# Patient Record
Sex: FEMALE | Race: WHITE | NOT HISPANIC OR LATINO | Employment: FULL TIME | URBAN - METROPOLITAN AREA
[De-identification: names, ages, dates, MRNs, and addresses within clinical notes are randomized per-mention and may not be internally consistent; named-entity substitution may affect disease eponyms.]

---

## 2023-04-01 ENCOUNTER — OFFICE VISIT (OUTPATIENT)
Dept: URGENT CARE | Facility: CLINIC | Age: 23
End: 2023-04-01
Payer: COMMERCIAL

## 2023-04-01 VITALS
HEIGHT: 60 IN | HEART RATE: 78 BPM | SYSTOLIC BLOOD PRESSURE: 138 MMHG | RESPIRATION RATE: 18 BRPM | WEIGHT: 180 LBS | BODY MASS INDEX: 35.34 KG/M2 | OXYGEN SATURATION: 100 % | DIASTOLIC BLOOD PRESSURE: 89 MMHG | TEMPERATURE: 97 F

## 2023-04-01 DIAGNOSIS — H66.92 LEFT OTITIS MEDIA, UNSPECIFIED OTITIS MEDIA TYPE: Primary | ICD-10-CM

## 2023-04-01 PROCEDURE — 99203 OFFICE O/P NEW LOW 30 MIN: CPT | Mod: S$GLB,,, | Performed by: NURSE PRACTITIONER

## 2023-04-01 PROCEDURE — 99203 PR OFFICE/OUTPT VISIT, NEW, LEVL III, 30-44 MIN: ICD-10-PCS | Mod: S$GLB,,, | Performed by: NURSE PRACTITIONER

## 2023-04-01 RX ORDER — AMOXICILLIN 500 MG/1
500 TABLET, FILM COATED ORAL EVERY 12 HOURS
Qty: 14 TABLET | Refills: 0 | Status: SHIPPED | OUTPATIENT
Start: 2023-04-01 | End: 2023-04-08

## 2023-04-01 RX ORDER — MINERAL OIL
180 ENEMA (ML) RECTAL
COMMUNITY
End: 2024-01-26 | Stop reason: ALTCHOICE

## 2023-04-01 RX ORDER — CETIRIZINE HYDROCHLORIDE 10 MG/1
10 TABLET ORAL
COMMUNITY
End: 2024-01-26 | Stop reason: ALTCHOICE

## 2023-04-01 RX ORDER — METFORMIN HYDROCHLORIDE 500 MG/1
500 TABLET ORAL 2 TIMES DAILY
COMMUNITY
Start: 2023-03-08 | End: 2024-01-26 | Stop reason: ALTCHOICE

## 2023-04-01 RX ORDER — LIDOCAINE HYDROCHLORIDE 20 MG/ML
SOLUTION ORAL; TOPICAL
COMMUNITY
Start: 2022-10-24 | End: 2024-01-26 | Stop reason: ALTCHOICE

## 2023-04-01 RX ORDER — SODIUM FLUORIDE 5 MG/G
CREAM DENTAL
COMMUNITY
Start: 2023-01-25 | End: 2024-01-26 | Stop reason: ALTCHOICE

## 2023-04-01 NOTE — PATIENT INSTRUCTIONS

## 2023-04-01 NOTE — PROGRESS NOTES
Subjective:      Patient ID: Ayaka Browne is a 22 y.o. female.    Vitals:  height is 5' (1.524 m) and weight is 81.6 kg (180 lb). Her temperature is 97.3 °F (36.3 °C). Her blood pressure is 138/89 and her pulse is 78. Her respiration is 18 and oxygen saturation is 100%.     Chief Complaint: Otalgia    Patient reports bilateral sided ear pain, with the left being worse since yesterday. She reports  2/10 pain. Patient has tried OTC ear drops with no relief.     Provider note begins below:  Patient reports that she cleaned both of her ears out with Debrox.  Patient reports that she is still having pain in her left ear today.  Patient denies any fever, chills, throat pain or cough.  Patient is awake and alert.  No acute distress.  Afebrile.    Otalgia   There is pain in both ears. This is a new problem. The current episode started yesterday. The problem occurs constantly. The problem has been unchanged. There has been no fever. The fever has been present for Less than 1 day. The pain is at a severity of 2/10. The pain is mild. Associated symptoms include ear discharge and hearing loss. Pertinent negatives include no coughing, diarrhea, rash, sore throat or vomiting. She has tried ear drops for the symptoms. The treatment provided no relief.     Constitution: Negative. Negative for chills, sweating and fatigue.   HENT:  Positive for ear pain, ear discharge and hearing loss. Negative for facial swelling, congestion and sore throat.    Neck: Negative for painful lymph nodes.   Cardiovascular: Negative.  Negative for chest trauma, chest pain and sob on exertion.   Eyes: Negative.  Negative for eye itching and eye pain.   Respiratory: Negative.  Negative for chest tightness, cough and asthma.    Gastrointestinal: Negative.  Negative for nausea, vomiting and diarrhea.   Endocrine: negative. cold intolerance and excessive thirst.   Genitourinary: Negative.  Negative for dysuria, frequency, urgency and hematuria.    Musculoskeletal:  Negative for pain, trauma and joint pain.   Skin: Negative.  Negative for rash, wound and hives.   Allergic/Immunologic: Negative.  Negative for eczema, asthma, hives and itching.   Neurological: Negative.  Negative for disorientation and altered mental status.   Hematologic/Lymphatic: Negative.  Negative for swollen lymph nodes.   Psychiatric/Behavioral: Negative.  Negative for altered mental status, disorientation and confusion.     Objective:     Physical Exam   Constitutional: She is oriented to person, place, and time. She appears well-developed. She is cooperative.  Non-toxic appearance. She does not appear ill. No distress.   HENT:   Head: Normocephalic and atraumatic.   Ears:   Right Ear: Hearing, external ear and ear canal normal. No middle ear effusion. impacted cerumen  Left Ear: Hearing, external ear and ear canal normal. There is tenderness. Tympanic membrane is injected and erythematous. A middle ear effusion is present. impacted cerumen  Nose: Nose normal. No mucosal edema, rhinorrhea or nasal deformity. No epistaxis. Right sinus exhibits no maxillary sinus tenderness and no frontal sinus tenderness. Left sinus exhibits no maxillary sinus tenderness and no frontal sinus tenderness.   Mouth/Throat: Uvula is midline, oropharynx is clear and moist and mucous membranes are normal. No trismus in the jaw. Normal dentition. No uvula swelling. No oropharyngeal exudate, posterior oropharyngeal edema or posterior oropharyngeal erythema.   Negative for tragus pain.      Comments: Negative for tragus pain.  Eyes: Conjunctivae and lids are normal. No scleral icterus.   Neck: Trachea normal and phonation normal. Neck supple. No edema present. No erythema present. No neck rigidity present.   Cardiovascular: Normal rate, regular rhythm, normal heart sounds and normal pulses.   Pulmonary/Chest: Effort normal and breath sounds normal. No stridor. No respiratory distress. She has no decreased breath  sounds. She has no wheezes. She has no rhonchi. She has no rales. She exhibits no tenderness.   Abdominal: Normal appearance.   Musculoskeletal: Normal range of motion.         General: No deformity. Normal range of motion.   Neurological: no focal deficit. She is alert and oriented to person, place, and time. She exhibits normal muscle tone. Coordination normal.   Skin: Skin is warm, dry, intact, not diaphoretic and not pale.   Psychiatric: Her speech is normal and behavior is normal. Mood, judgment and thought content normal.   Nursing note and vitals reviewed.    Assessment:     1. Left otitis media, unspecified otitis media type        Plan:   FOLLOWUP  Follow up if symptoms worsen or fail to improve, for PLEASE CONTACT PCP OR CONTACT THE EMERGENCY ROOM..     PATIENT INSTRUCTIONS  Patient Instructions   INSTRUCTIONS:  - Rest.  - Drink plenty of fluids.  - Take Tylenol and/or Ibuprofen as directed as needed for fever/pain.  Do not take more than the recommended dose.  - follow up with your PCP within the next 1-2 weeks as needed.  - You must understand that you have received an Urgent Care treatment only and that you may be released before all of your medical problems are known or treated.   - You, the patient, will arrange for follow up care as instructed.   - If your condition worsens or fails to improve we recommend that you receive another evaluation at the ER immediately or contact your PCP to discuss your concerns.   - You can call [phone] or [phone] to help schedule an appointment with the appropriate provider.     -If you smoke cigarettes, it would be beneficial for you to stop.         THANK YOU FOR ALLOWING ME TO PARTICIPATE IN YOUR HEALTHCARE,     Gianluca Ledezma NP     Left otitis media, unspecified otitis media type  -     amoxicillin (AMOXIL) 500 MG Tab; Take 1 tablet (500 mg total) by mouth every 12 (twelve) hours. for 7 days  Dispense: 14 tablet; Refill: 0

## 2023-09-12 ENCOUNTER — TELEPHONE (OUTPATIENT)
Dept: OBSTETRICS AND GYNECOLOGY | Facility: CLINIC | Age: 23
End: 2023-09-12
Payer: COMMERCIAL

## 2023-09-12 NOTE — TELEPHONE ENCOUNTER
Patient wanted a sooner appointment due to nipple discharge and lump in breast. Appointment scheduled with La[facility]. Patient verbalized understanding. Wanted to keep appointment with Eric[facility]on.    ----- Message from [place] [facility] sent at 9/12/2023  8:26 AM CDT -----  Regarding: sooner appt  Contact: oly at [phone]  Type:  Sooner Appointment Request    Caller is requesting a sooner appointment.  Caller declined first available appointment listed below.  Caller will not accept being placed on the waitlist and is requesting a message be sent to doctor.    Name of Caller:  Oly    When is the first available appointment?  11/29 (pt's current appt)    Symptoms:  Lump in Rt Breast    Best Call Back Number:  [phone]    Additional Information:  pt is ok to get referral to [facility] if get seen sooner.

## 2023-09-20 ENCOUNTER — OFFICE VISIT (OUTPATIENT)
Dept: OBSTETRICS AND GYNECOLOGY | Facility: CLINIC | Age: 23
End: 2023-09-20
Payer: COMMERCIAL

## 2023-09-20 VITALS
BODY MASS INDEX: 33.46 KG/M2 | WEIGHT: 170.44 LBS | HEIGHT: 60 IN | SYSTOLIC BLOOD PRESSURE: 114 MMHG | DIASTOLIC BLOOD PRESSURE: 70 MMHG

## 2023-09-20 DIAGNOSIS — N60.01 CYST OF RIGHT BREAST: Primary | ICD-10-CM

## 2023-09-20 PROCEDURE — 99999 PR PBB SHADOW E&M-EST. PATIENT-LVL III: ICD-10-PCS | Mod: PBBFAC,,, | Performed by: STUDENT IN AN ORGANIZED HEALTH CARE EDUCATION/TRAINING PROGRAM

## 2023-09-20 PROCEDURE — 1159F MED LIST DOCD IN RCRD: CPT | Mod: CPTII,S$GLB,, | Performed by: STUDENT IN AN ORGANIZED HEALTH CARE EDUCATION/TRAINING PROGRAM

## 2023-09-20 PROCEDURE — 3074F SYST BP LT 130 MM HG: CPT | Mod: CPTII,S$GLB,, | Performed by: STUDENT IN AN ORGANIZED HEALTH CARE EDUCATION/TRAINING PROGRAM

## 2023-09-20 PROCEDURE — 3008F BODY MASS INDEX DOCD: CPT | Mod: CPTII,S$GLB,, | Performed by: STUDENT IN AN ORGANIZED HEALTH CARE EDUCATION/TRAINING PROGRAM

## 2023-09-20 PROCEDURE — 3078F PR MOST RECENT DIASTOLIC BLOOD PRESSURE < 80 MM HG: ICD-10-PCS | Mod: CPTII,S$GLB,, | Performed by: STUDENT IN AN ORGANIZED HEALTH CARE EDUCATION/TRAINING PROGRAM

## 2023-09-20 PROCEDURE — 3078F DIAST BP <80 MM HG: CPT | Mod: CPTII,S$GLB,, | Performed by: STUDENT IN AN ORGANIZED HEALTH CARE EDUCATION/TRAINING PROGRAM

## 2023-09-20 PROCEDURE — 99204 OFFICE O/P NEW MOD 45 MIN: CPT | Mod: S$GLB,,, | Performed by: STUDENT IN AN ORGANIZED HEALTH CARE EDUCATION/TRAINING PROGRAM

## 2023-09-20 PROCEDURE — 99999 PR PBB SHADOW E&M-EST. PATIENT-LVL III: CPT | Mod: PBBFAC,,, | Performed by: STUDENT IN AN ORGANIZED HEALTH CARE EDUCATION/TRAINING PROGRAM

## 2023-09-20 PROCEDURE — 99204 PR OFFICE/OUTPT VISIT, NEW, LEVL IV, 45-59 MIN: ICD-10-PCS | Mod: S$GLB,,, | Performed by: STUDENT IN AN ORGANIZED HEALTH CARE EDUCATION/TRAINING PROGRAM

## 2023-09-20 PROCEDURE — 1159F PR MEDICATION LIST DOCUMENTED IN MEDICAL RECORD: ICD-10-PCS | Mod: CPTII,S$GLB,, | Performed by: STUDENT IN AN ORGANIZED HEALTH CARE EDUCATION/TRAINING PROGRAM

## 2023-09-20 PROCEDURE — 3074F PR MOST RECENT SYSTOLIC BLOOD PRESSURE < 130 MM HG: ICD-10-PCS | Mod: CPTII,S$GLB,, | Performed by: STUDENT IN AN ORGANIZED HEALTH CARE EDUCATION/TRAINING PROGRAM

## 2023-09-20 PROCEDURE — 3008F PR BODY MASS INDEX (BMI) DOCUMENTED: ICD-10-PCS | Mod: CPTII,S$GLB,, | Performed by: STUDENT IN AN ORGANIZED HEALTH CARE EDUCATION/TRAINING PROGRAM

## 2023-09-20 NOTE — PROGRESS NOTES
History & Physical  Gynecology      SUBJECTIVE:     Chief Complaint: Breast mass/Nipple Discharge and Establish Care       History of Present Illness:    Here today for right breast mass. Has been present for at least a year. Occasional clear, spontaneous nipple discharge. No bloody nipple dc. No breast trauma. No immediate FH of breast cancer. No new meds. IUD, no cycles. Sometimes feels like it changes depending on timing of the month.       Review of patient's allergies indicates:   Allergen Reactions    Azithromycin Rash    Minocycline Swelling     Other reaction(s): lips, eyes and hands swellig  Swelling of face and lips  Swelling of face and lips      Codeine     Rice Other (See Comments)     Congestion  Congestion         History reviewed. No pertinent past medical history.  Past Surgical History:   Procedure Laterality Date    ANTERIOR CRUCIATE LIGAMENT REPAIR      TONSILLECTOMY AND ADENOIDECTOMY      WISDOM TOOTH EXTRACTION      WRIST SURGERY       OB History          0    Para   0    Term   0       0    AB   0    Living   0         SAB   0    IAB   0    Ectopic   0    Multiple   0    Live Births   0               Family History   Problem Relation Age of Onset    Ovarian cancer Paternal Grandmother     Colon cancer Maternal Grandmother     Breast cancer Neg Hx      Social History     Tobacco Use    Smoking status: Never    Smokeless tobacco: Never   Substance Use Topics    Alcohol use: Yes     Comment: Occ/socially    Drug use: Never       Current Outpatient Medications   Medication Sig    cetirizine (ZYRTEC) 10 MG tablet Take 10 mg by mouth.    fexofenadine (ALLEGRA) 180 MG tablet Take 180 mg by mouth.    LIDOCAINE VISCOUS 2 % solution SMARTSIG:15 Milliliter(s) By Mouth Every 3 Hours PRN    metFORMIN (GLUCOPHAGE) 500 MG tablet Take 500 mg by mouth 2 (two) times daily.    SODIUM FLUORIDE 5000 PLUS 1.1 % Crea SMARTSIG:sparingly Topical Every Night     No current facility-administered  medications for this visit.         Review of Systems:  Review of Systems   Constitutional:  Negative for chills, fatigue and fever.   HENT:  Negative for congestion.    Eyes:  Negative for visual disturbance.   Respiratory:  Negative for cough and shortness of breath.    Cardiovascular:  Negative for chest pain and palpitations.   Gastrointestinal:  Negative for abdominal distention, abdominal pain, constipation, diarrhea, nausea and vomiting.   Genitourinary:  Negative for difficulty urinating, dysuria, hematuria, vaginal bleeding and vaginal discharge.   Skin:  Negative for rash.   Neurological:  Negative for dizziness, seizures, light-headedness and headaches.   Hematological:  Does not bruise/bleed easily.   Psychiatric/Behavioral:  Negative for dysphoric mood. The patient is not nervous/anxious.         OBJECTIVE:     Physical Exam:  Physical Exam  Vitals reviewed.   Constitutional:       General: She is not in acute distress.     Appearance: Normal appearance. She is well-developed.   HENT:      Head: Normocephalic and atraumatic.   Cardiovascular:      Rate and Rhythm: Normal rate and regular rhythm.   Pulmonary:      Effort: Pulmonary effort is normal.   Chest:   Breasts:     Right: Mass present. No swelling, bleeding, inverted nipple, nipple discharge, skin change or tenderness.      Left: Swelling present. No bleeding, inverted nipple, mass, nipple discharge, skin change or tenderness.          Comments: 1cm breast cyst noted 9-10 oclock 2cm from areola. Some induration and swelling present around it.   Abdominal:      General: There is no distension.      Palpations: Abdomen is soft.   Genitourinary:     Vagina: Normal.   Skin:     General: Skin is warm.   Neurological:      Mental Status: She is alert and oriented to person, place, and time.   Psychiatric:         Behavior: Behavior normal.         Thought Content: Thought content normal.         Judgment: Judgment normal.           ASSESSMENT:        ICD-10-CM ICD-9-CM    1. Cyst of right breast  N60.01 610.0 US Breast Right Complete      PROLACTIN          Orders Placed This Encounter   Procedures    US Breast Right Complete     Standing Status:   Future     Standing Expiration Date:   9/20/2025     Order Specific Question:   May the Radiologist modify the order per protocol to meet the clinical needs of the patient?     Answer:   Yes     Order Specific Question:   Release to patient     Answer:   Immediate    PROLACTIN     Standing Status:   Future     Standing Expiration Date:   11/18/2024           Plan:      - reassurance given  - discussed likely fibroadenoma vs fibrocystic breast dx.   - US and prolactin ordered. Discussed likely will just do surveillance based off of symptoms and annual breast exam  - all questions answered    Cait Almendarez M.D.  Obstetrics and Gynecology

## 2023-09-21 ENCOUNTER — HOSPITAL ENCOUNTER (OUTPATIENT)
Dept: RADIOLOGY | Facility: HOSPITAL | Age: 23
Discharge: HOME | End: 2023-09-21
Attending: STUDENT IN AN ORGANIZED HEALTH CARE EDUCATION/TRAINING PROGRAM
Payer: COMMERCIAL

## 2023-09-21 DIAGNOSIS — N60.01 CYST OF RIGHT BREAST: ICD-10-CM

## 2023-09-21 PROCEDURE — 76642 US BREAST RIGHT LIMITED: ICD-10-PCS | Mod: 26,RT,, | Performed by: RADIOLOGY

## 2023-09-21 PROCEDURE — 76642 ULTRASOUND BREAST LIMITED: CPT | Mod: TC,PO,RT

## 2023-09-21 PROCEDURE — 76642 ULTRASOUND BREAST LIMITED: CPT | Mod: 26,RT,, | Performed by: RADIOLOGY

## 2023-10-12 ENCOUNTER — TELEPHONE (OUTPATIENT)
Dept: OBSTETRICS AND GYNECOLOGY | Facility: CLINIC | Age: 23
End: 2023-10-12
Payer: COMMERCIAL

## 2023-10-12 NOTE — TELEPHONE ENCOUNTER
----- Message from Miladys Suarez sent at 10/12/2023 12:42 PM CDT -----  Contact: self  Type:  Needs Medical Advice    Who Called: self  Symptoms (please be specific):pt wants to make sure she can see dr before her maternity leave, Pt would like to see if she can be seen earlier or can a different dr do her yearly exam.   Would the patient rather a call back or a response via [facility]? call  Best Call Back Number: [phone] (home)     Additional Information: please call and advise. Thank you.

## 2024-03-12 RX ORDER — FLUCONAZOLE 150 MG/1
150 TABLET ORAL ONCE
Qty: 1 TABLET | Refills: 1 | Status: SHIPPED | OUTPATIENT
Start: 2024-03-12 | End: 2024-03-12

## 2024-03-13 RX ORDER — FLUCONAZOLE 150 MG/1
150 TABLET ORAL ONCE
Qty: 1 TABLET | Refills: 1 | OUTPATIENT
Start: 2024-03-13 | End: 2024-03-13

## 2024-03-20 ENCOUNTER — OFFICE VISIT (OUTPATIENT)
Dept: OBSTETRICS AND GYNECOLOGY | Facility: CLINIC | Age: 24
End: 2024-03-20
Payer: COMMERCIAL

## 2024-03-20 VITALS
HEIGHT: 60 IN | BODY MASS INDEX: 34.49 KG/M2 | DIASTOLIC BLOOD PRESSURE: 72 MMHG | SYSTOLIC BLOOD PRESSURE: 112 MMHG | WEIGHT: 175.69 LBS

## 2024-03-20 DIAGNOSIS — E28.2 PCOS (POLYCYSTIC OVARIAN SYNDROME): ICD-10-CM

## 2024-03-20 DIAGNOSIS — N89.8 VAGINAL IRRITATION: ICD-10-CM

## 2024-03-20 DIAGNOSIS — Z01.419 WELL WOMAN EXAM: Primary | ICD-10-CM

## 2024-03-20 PROCEDURE — 1159F MED LIST DOCD IN RCRD: CPT | Mod: CPTII,S$GLB,, | Performed by: STUDENT IN AN ORGANIZED HEALTH CARE EDUCATION/TRAINING PROGRAM

## 2024-03-20 PROCEDURE — 3078F DIAST BP <80 MM HG: CPT | Mod: CPTII,S$GLB,, | Performed by: STUDENT IN AN ORGANIZED HEALTH CARE EDUCATION/TRAINING PROGRAM

## 2024-03-20 PROCEDURE — 99214 OFFICE O/P EST MOD 30 MIN: CPT | Mod: 25,S$GLB,, | Performed by: STUDENT IN AN ORGANIZED HEALTH CARE EDUCATION/TRAINING PROGRAM

## 2024-03-20 PROCEDURE — 3008F BODY MASS INDEX DOCD: CPT | Mod: CPTII,S$GLB,, | Performed by: STUDENT IN AN ORGANIZED HEALTH CARE EDUCATION/TRAINING PROGRAM

## 2024-03-20 PROCEDURE — 87624 HPV HI-RISK TYP POOLED RSLT: CPT | Performed by: STUDENT IN AN ORGANIZED HEALTH CARE EDUCATION/TRAINING PROGRAM

## 2024-03-20 PROCEDURE — 87491 CHLMYD TRACH DNA AMP PROBE: CPT | Performed by: STUDENT IN AN ORGANIZED HEALTH CARE EDUCATION/TRAINING PROGRAM

## 2024-03-20 PROCEDURE — 3074F SYST BP LT 130 MM HG: CPT | Mod: CPTII,S$GLB,, | Performed by: STUDENT IN AN ORGANIZED HEALTH CARE EDUCATION/TRAINING PROGRAM

## 2024-03-20 PROCEDURE — 99395 PREV VISIT EST AGE 18-39: CPT | Mod: 25,S$GLB,, | Performed by: STUDENT IN AN ORGANIZED HEALTH CARE EDUCATION/TRAINING PROGRAM

## 2024-03-20 PROCEDURE — 99999 PR PBB SHADOW E&M-EST. PATIENT-LVL III: CPT | Mod: PBBFAC,,, | Performed by: STUDENT IN AN ORGANIZED HEALTH CARE EDUCATION/TRAINING PROGRAM

## 2024-03-20 PROCEDURE — 81514 NFCT DS BV&VAGINITIS DNA ALG: CPT | Performed by: STUDENT IN AN ORGANIZED HEALTH CARE EDUCATION/TRAINING PROGRAM

## 2024-03-20 PROCEDURE — 88175 CYTOPATH C/V AUTO FLUID REDO: CPT | Performed by: STUDENT IN AN ORGANIZED HEALTH CARE EDUCATION/TRAINING PROGRAM

## 2024-03-20 RX ORDER — METFORMIN HYDROCHLORIDE 500 MG/1
500 TABLET, EXTENDED RELEASE ORAL
Qty: 90 TABLET | Refills: 3 | Status: SHIPPED | OUTPATIENT
Start: 2024-03-20 | End: 2025-03-20

## 2024-03-20 NOTE — PROGRESS NOTES
History & Physical  Gynecology      SUBJECTIVE:     Chief Complaint: Well Woman       History of Present Illness:    Here for annual. Hx of PCOS. Having some vaginal irritation     Gyn: irregular periods/amenorrhea, has IUD in place. No hx of abnormal pap. Just recently became sexually active. No hx of STI. No immediate FH of gyn or colon cancer    No tobacco     L&D RN @ [facility]    Review of patient's allergies indicates:   Allergen Reactions    Azithromycin Rash    Minocycline Swelling     Other reaction(s): lips, eyes and hands swellig  Swelling of face and lips  Swelling of face and lips      Codeine     Rice Other (See Comments)     Congestion  Congestion         No past medical history on file.  Past Surgical History:   Procedure Laterality Date    ANTERIOR CRUCIATE LIGAMENT REPAIR      TONSILLECTOMY AND ADENOIDECTOMY      WISDOM TOOTH EXTRACTION      WRIST SURGERY       OB History          0    Para   0    Term   0       0    AB   0    Living   0         SAB   0    IAB   0    Ectopic   0    Multiple   0    Live Births   0               Family History   Problem Relation Age of Onset    Colon cancer Maternal Grandmother     Ovarian cancer Paternal Grandmother     Breast cancer Neg Hx      Social History     Tobacco Use    Smoking status: Never    Smokeless tobacco: Never   Substance Use Topics    Alcohol use: Yes     Comment: Occ/socially    Drug use: Never       Current Outpatient Medications   Medication Sig    busPIRone (BUSPAR) 10 MG tablet Take 1 tablet (10 mg total) by mouth 3 (three) times daily as needed (anxiety).    FLUoxetine 10 MG capsule Take 1 capsule (10 mg total) by mouth once daily.    metFORMIN (GLUCOPHAGE-XR) 500 MG ER 24hr tablet Take 1 tablet (500 mg total) by mouth daily with breakfast.    propranoloL (INDERAL) 10 MG tablet Take 1 tablet (10 mg total) by mouth daily as needed (anxiety).    tiZANidine (ZANAFLEX) 2 MG tablet Take 2 tablets (4 mg total) by mouth nightly as needed  (headache).    WEGOVY 1.7 mg/0.75 mL PnIj      No current facility-administered medications for this visit.         Review of Systems:  Review of Systems   Constitutional:  Negative for chills, fatigue and fever.   HENT:  Negative for congestion.    Eyes:  Negative for visual disturbance.   Respiratory:  Negative for cough and shortness of breath.    Cardiovascular:  Negative for chest pain and palpitations.   Gastrointestinal:  Negative for abdominal distention, abdominal pain, constipation, diarrhea, nausea and vomiting.   Genitourinary:  Negative for difficulty urinating, dysuria, hematuria, vaginal bleeding and vaginal discharge.   Skin:  Negative for rash.   Neurological:  Negative for dizziness, seizures, light-headedness and headaches.   Hematological:  Does not bruise/bleed easily.   Psychiatric/Behavioral:  Negative for dysphoric mood. The patient is not nervous/anxious.         OBJECTIVE:     Physical Exam:  Physical Exam  Vitals reviewed.   Constitutional:       General: She is not in acute distress.     Appearance: Normal appearance. She is well-developed.   HENT:      Head: Normocephalic and atraumatic.   Cardiovascular:      Rate and Rhythm: Normal rate and regular rhythm.   Pulmonary:      Effort: Pulmonary effort is normal.   Chest:   Breasts:     Right: No inverted nipple, mass, nipple discharge, skin change or tenderness.      Left: No inverted nipple, mass, nipple discharge, skin change or tenderness.   Abdominal:      General: There is no distension.      Palpations: Abdomen is soft.      Tenderness: There is no abdominal tenderness.   Genitourinary:     Vagina: Normal.      Comments: Normal external female genitalia, normal hair distribution. Vaginal mucosa pink, moist, well rugated, scant white physiologic discharge. No blood in vault. Cervix pink, non-friable, without lesion. No CMT. Uterus non tender, mobile, not enlarged. Adnexa without fullness or tenderness.    IUD strings  present    Chaperone present   Skin:     General: Skin is warm.   Neurological:      Mental Status: She is alert and oriented to person, place, and time.   Psychiatric:         Behavior: Behavior normal.         Thought Content: Thought content normal.         Judgment: Judgment normal.           ASSESSMENT:       ICD-10-CM ICD-9-CM    1. Well woman exam  Z01.419 V72.31 Liquid-Based Pap Smear, Screening      C. trachomatis/N. gonorrhoeae by AMP DNA      2. PCOS (polycystic ovarian syndrome)  E28.2 256.4 Hemoglobin A1C      metFORMIN (GLUCOPHAGE-XR) 500 MG ER 24hr tablet      3. Vaginal irritation  N89.8 623.9 Vaginosis Screen by DNA Probe          Orders Placed This Encounter   Procedures    C. trachomatis/N. gonorrhoeae by AMP DNA     Order Specific Question:   Source:     Answer:   Cervicovaginal    Vaginosis Screen by DNA Probe     Release to patient->Immediate     Order Specific Question:   Release to patient     Answer:   Immediate    Hemoglobin A1C     Standing Status:   Future     Standing Expiration Date:   6/18/2025           Plan:      Well woman  - - Pap today   - tobacco cessation n/a  - contraception IUD  - HPV vaccine hasn't had, consider   - Safe Sex n/a  - Counseled to take daily multivitamin. If patient is of reproductive age and not on contraception, to take prenatal vitamin. Patient has been counseled on the vitamin D and calcium requirements per ACOG recommendations.  Age   Calcium(mg/day)   Vitamin D (IU/day)  9-18    1300                       600  19-50  1000                       600  51-70  1200                       600  >70      1,200                      800  Patient to start PNV, vit D  - Covid vaccine n/a  - Discussed IPV, feels safe     PCOS:  - hemoglobin A1c ordered  - start myoinositiol supplements  - metformin, discussed limitiation    Vaginal irritation  - likely friction from recent new sexual activity  - affirm collected    Cait Almendarez M.D.  Obstetrics and Gynecology

## 2024-03-21 ENCOUNTER — LAB VISIT (OUTPATIENT)
Dept: LAB | Facility: HOSPITAL | Age: 24
End: 2024-03-21
Attending: STUDENT IN AN ORGANIZED HEALTH CARE EDUCATION/TRAINING PROGRAM
Payer: COMMERCIAL

## 2024-03-21 DIAGNOSIS — E28.2 PCOS (POLYCYSTIC OVARIAN SYNDROME): ICD-10-CM

## 2024-03-21 LAB
BACTERIAL VAGINOSIS DNA: NEGATIVE
C TRACH DNA SPEC QL NAA+PROBE: NOT DETECTED
CANDIDA GLABRATA DNA: NEGATIVE
CANDIDA KRUSEI DNA: NEGATIVE
CANDIDA RRNA VAG QL PROBE: NEGATIVE
ESTIMATED AVG GLUCOSE: 85 MG/DL (ref 68–131)
HBA1C MFR BLD: 4.6 % (ref 4–5.6)
N GONORRHOEA DNA SPEC QL NAA+PROBE: NOT DETECTED
T VAGINALIS RRNA GENITAL QL PROBE: NEGATIVE

## 2024-03-21 PROCEDURE — 36415 COLL VENOUS BLD VENIPUNCTURE: CPT | Mod: PN | Performed by: STUDENT IN AN ORGANIZED HEALTH CARE EDUCATION/TRAINING PROGRAM

## 2024-03-21 PROCEDURE — 83036 HEMOGLOBIN GLYCOSYLATED A1C: CPT | Performed by: STUDENT IN AN ORGANIZED HEALTH CARE EDUCATION/TRAINING PROGRAM

## 2024-03-30 LAB
CLINICAL INFO: ABNORMAL
CYTO CVX: ABNORMAL
CYTOLOGIST CVX/VAG CYTO: ABNORMAL
CYTOLOGIST CVX/VAG CYTO: ABNORMAL
CYTOLOGY CMNT CVX/VAG CYTO-IMP: ABNORMAL
CYTOLOGY PAP THIN PREP EXPLANATION: ABNORMAL
DATE OF PREVIOUS PAP: ABNORMAL
DATE PREVIOUS BX: NO
GEN CATEG CVX/VAG CYTO-IMP: ABNORMAL
HPV I/H RISK 4 DNA CVX QL NAA+PROBE: NOT DETECTED
LMP START DATE: ABNORMAL
MICROORGANISM CVX/VAG CYTO: ABNORMAL
PATHOLOGIST CVX/VAG CYTO: ABNORMAL
SERVICE CMNT-IMP: ABNORMAL
SPECIMEN SOURCE CVX/VAG CYTO: ABNORMAL
STAT OF ADQ CVX/VAG CYTO-IMP: ABNORMAL

## 2024-05-02 ENCOUNTER — PATIENT MESSAGE (OUTPATIENT)
Dept: OBSTETRICS AND GYNECOLOGY | Facility: CLINIC | Age: 24
End: 2024-05-02
Payer: COMMERCIAL

## 2024-10-01 ENCOUNTER — PATIENT MESSAGE (OUTPATIENT)
Dept: OBSTETRICS AND GYNECOLOGY | Facility: CLINIC | Age: 24
End: 2024-10-01

## 2024-10-01 ENCOUNTER — OFFICE VISIT (OUTPATIENT)
Dept: OBSTETRICS AND GYNECOLOGY | Facility: CLINIC | Age: 24
End: 2024-10-01
Payer: COMMERCIAL

## 2024-10-01 VITALS
DIASTOLIC BLOOD PRESSURE: 88 MMHG | HEIGHT: 60 IN | WEIGHT: 196.44 LBS | BODY MASS INDEX: 38.56 KG/M2 | SYSTOLIC BLOOD PRESSURE: 122 MMHG

## 2024-10-01 DIAGNOSIS — N76.0 ACUTE VAGINITIS: Primary | ICD-10-CM

## 2024-10-01 DIAGNOSIS — R68.82 DECREASED LIBIDO: ICD-10-CM

## 2024-10-01 DIAGNOSIS — N95.2 VAGINAL ATROPHY: ICD-10-CM

## 2024-10-01 PROCEDURE — 1159F MED LIST DOCD IN RCRD: CPT | Mod: CPTII,S$GLB,, | Performed by: STUDENT IN AN ORGANIZED HEALTH CARE EDUCATION/TRAINING PROGRAM

## 2024-10-01 PROCEDURE — 87491 CHLMYD TRACH DNA AMP PROBE: CPT | Performed by: STUDENT IN AN ORGANIZED HEALTH CARE EDUCATION/TRAINING PROGRAM

## 2024-10-01 PROCEDURE — 3008F BODY MASS INDEX DOCD: CPT | Mod: CPTII,S$GLB,, | Performed by: STUDENT IN AN ORGANIZED HEALTH CARE EDUCATION/TRAINING PROGRAM

## 2024-10-01 PROCEDURE — 3079F DIAST BP 80-89 MM HG: CPT | Mod: CPTII,S$GLB,, | Performed by: STUDENT IN AN ORGANIZED HEALTH CARE EDUCATION/TRAINING PROGRAM

## 2024-10-01 PROCEDURE — 87591 N.GONORRHOEAE DNA AMP PROB: CPT | Performed by: STUDENT IN AN ORGANIZED HEALTH CARE EDUCATION/TRAINING PROGRAM

## 2024-10-01 PROCEDURE — 0352U VAGINOSIS SCREEN BY DNA PROBE: CPT | Performed by: STUDENT IN AN ORGANIZED HEALTH CARE EDUCATION/TRAINING PROGRAM

## 2024-10-01 PROCEDURE — 99999 PR PBB SHADOW E&M-EST. PATIENT-LVL III: CPT | Mod: PBBFAC,,, | Performed by: STUDENT IN AN ORGANIZED HEALTH CARE EDUCATION/TRAINING PROGRAM

## 2024-10-01 PROCEDURE — 99214 OFFICE O/P EST MOD 30 MIN: CPT | Mod: S$GLB,,, | Performed by: STUDENT IN AN ORGANIZED HEALTH CARE EDUCATION/TRAINING PROGRAM

## 2024-10-01 PROCEDURE — 3044F HG A1C LEVEL LT 7.0%: CPT | Mod: CPTII,S$GLB,, | Performed by: STUDENT IN AN ORGANIZED HEALTH CARE EDUCATION/TRAINING PROGRAM

## 2024-10-01 PROCEDURE — 3074F SYST BP LT 130 MM HG: CPT | Mod: CPTII,S$GLB,, | Performed by: STUDENT IN AN ORGANIZED HEALTH CARE EDUCATION/TRAINING PROGRAM

## 2024-10-01 RX ORDER — CONJUGATED ESTROGENS 0.62 MG/G
CREAM VAGINAL
Qty: 45 G | Refills: 12 | Status: SHIPPED | OUTPATIENT
Start: 2024-10-01 | End: 2024-10-02

## 2024-10-01 RX ORDER — FLUCONAZOLE 150 MG/1
150 TABLET ORAL ONCE
Qty: 1 TABLET | Refills: 1 | Status: SHIPPED | OUTPATIENT
Start: 2024-10-01 | End: 2024-10-01

## 2024-10-02 RX ORDER — ESTRADIOL 0.1 MG/G
1 CREAM VAGINAL DAILY
Qty: 42.5 G | Refills: 1 | Status: SHIPPED | OUTPATIENT
Start: 2024-10-02 | End: 2025-10-02

## 2024-10-02 NOTE — PROGRESS NOTES
History & Physical  Gynecology      SUBJECTIVE:     Chief Complaint: IUD concerns, Vaginal Atrophy, and Low libido       History of Present Illness:    Here today to talk about vaginal dryness and low libido. For the past 6 months ++ pain with sex, feels very dry. Friction pain. No deep pain. Sex never painful before and dryness not an issue. Lubricants and coconut oil not working, embarrassed by decrease in natural lubrication. Now negative reaction with sex, no interest in it. No masturbation. Can orgasm but not lately.     No vaginal itching or discharge. Just dryness.     IUD in place for over 3 years, mirena.       Review of patient's allergies indicates:   Allergen Reactions    Azithromycin Rash    Minocycline Swelling     Other reaction(s): lips, eyes and hands swellig  Swelling of face and lips  Swelling of face and lips      Codeine     Rice Other (See Comments)     Congestion  Congestion         No past medical history on file.  Past Surgical History:   Procedure Laterality Date    ANTERIOR CRUCIATE LIGAMENT REPAIR      TONSILLECTOMY  2006    TONSILLECTOMY AND ADENOIDECTOMY      WISDOM TOOTH EXTRACTION      WRIST SURGERY       OB History          0    Para   0    Term   0       0    AB   0    Living   0         SAB   0    IAB   0    Ectopic   0    Multiple   0    Live Births   0               Family History   Problem Relation Name Age of Onset    Hyperlipidemia Mother bev davis     Early death Father yunier davis     Colon cancer Maternal Grandmother      Ovarian cancer Paternal Grandmother julio     Cancer Paternal Grandmother julio     Breast cancer Neg Hx       Social History     Tobacco Use    Smoking status: Never    Smokeless tobacco: Never   Substance Use Topics    Alcohol use: Yes     Alcohol/week: 1.0 standard drink of alcohol     Types: 1 Glasses of wine per week     Comment: Occ/socially    Drug use: Never       Current Outpatient Medications   Medication Sig    (Magic  mouthwash) 1:1:1 diphenhydrAMINE(Benadryl) 12.5mg/5ml liq, aluminum & magnesium hydroxide-simethicone (Maalox), LIDOcaine viscous 2% Swish and spit 5 mLs every 4 (four) hours as needed (oral pain). for mouth sores    busPIRone (BUSPAR) 10 MG tablet Take 1 tablet (10 mg total) by mouth 3 (three) times daily as needed (anxiety).    conjugated estrogens (PREMARIN) vaginal cream Place a pea-sized amount in vagina every night for 2 weeks, then use 2-3 nights a week    metFORMIN (GLUCOPHAGE-XR) 500 MG ER 24hr tablet Take 1 tablet (500 mg total) by mouth daily with breakfast.    ondansetron (ZOFRAN) 4 MG tablet Take 1 tablet (4 mg total) by mouth every 8 (eight) hours as needed for Nausea.    propranoloL (INDERAL) 10 MG tablet Take 1 tablet (10 mg total) by mouth daily as needed (anxiety).    tirzepatide, weight loss, (ZEPBOUND) 5 mg/0.5 mL PnIj Inject 5 mg into the skin every 7 days.    tiZANidine (ZANAFLEX) 2 MG tablet Take 2 tablets (4 mg total) by mouth nightly as needed (headache).     No current facility-administered medications for this visit.         Review of Systems:  Review of Systems   Constitutional:  Negative for chills, fatigue and fever.   HENT:  Negative for congestion.    Eyes:  Negative for visual disturbance.   Respiratory:  Negative for cough and shortness of breath.    Cardiovascular:  Negative for chest pain and palpitations.   Gastrointestinal:  Negative for abdominal distention, abdominal pain, constipation, diarrhea, nausea and vomiting.   Genitourinary:  Negative for difficulty urinating, dysuria, hematuria, vaginal bleeding and vaginal discharge.   Skin:  Negative for rash.   Neurological:  Negative for dizziness, seizures, light-headedness and headaches.   Hematological:  Does not bruise/bleed easily.   Psychiatric/Behavioral:  Negative for dysphoric mood. The patient is not nervous/anxious.         OBJECTIVE:     Physical Exam:  Physical Exam  Vitals reviewed.   Constitutional:       General:  She is not in acute distress.     Appearance: Normal appearance. She is well-developed.   HENT:      Head: Normocephalic and atraumatic.   Cardiovascular:      Rate and Rhythm: Normal rate and regular rhythm.   Pulmonary:      Effort: Pulmonary effort is normal.   Abdominal:      General: There is no distension.      Palpations: Abdomen is soft.   Genitourinary:     Vagina: Normal.      Comments: IUD strings in endocervical canal. ++ yeast in vagina  Skin:     General: Skin is warm.   Neurological:      Mental Status: She is alert and oriented to person, place, and time.   Psychiatric:         Behavior: Behavior normal.         Thought Content: Thought content normal.         Judgment: Judgment normal.           ASSESSMENT:       ICD-10-CM ICD-9-CM    1. Acute vaginitis  N76.0 616.10 fluconazole (DIFLUCAN) 150 MG Tab      C. trachomatis/N. gonorrhoeae by AMP DNA      Vaginosis Screen by DNA Probe      2. Vaginal atrophy  N95.2 627.3 conjugated estrogens (PREMARIN) vaginal cream      3. Decreased libido  R68.82 799.81           Orders Placed This Encounter   Procedures    C. trachomatis/N. gonorrhoeae by AMP DNA     Order Specific Question:   Source:     Answer:   Cervicovaginal    Vaginosis Screen by DNA Probe     Release to patient->Immediate  Send normal result to authorizing provider's In Basket if  patient is active on MyChart:->Yes     Order Specific Question:   Release to patient     Answer:   Immediate     Order Specific Question:   Send normal result to authorizing provider's In Basket if patient is active on MyChart:     Answer:   Yes           Plan:      - discussed tackling vaginal dryness and pain then libido as they are related  - + yeast clinically today, swabs checked  - encourage vaginal estrogen cream after treatment of yeast  - if no improvement consider IUD removal as sometimes progesterone can cause vaginal atrophy, or swap for lower progesterone IUD  - Discussed sex drive in women is usually  "multifactorial with social and other psychological stressors. Discussed eliminating stressors or acknowledging that sometimes we need to stop "thinking about everything else" to get into mood for sex-this is normal for women. We discussed being able to orgasm alone and how patient can still enjoy sex. If sex is painful- this can create a cycle where we don't want to have sex because of associated pain. We can figure out what is causing pain. We can work on vaginal dryness with certain medications-discussed this with patient. Discussed some books to help as well  - f/u in 6 weeks    Cait Almendarez M.D.  Obstetrics and Gynecology          "

## 2024-10-03 ENCOUNTER — PATIENT MESSAGE (OUTPATIENT)
Dept: OBSTETRICS AND GYNECOLOGY | Facility: CLINIC | Age: 24
End: 2024-10-03
Payer: COMMERCIAL

## 2024-10-03 RX ORDER — TERCONAZOLE 4 MG/G
1 CREAM VAGINAL DAILY
Qty: 45 G | Refills: 1 | Status: SHIPPED | OUTPATIENT
Start: 2024-10-03 | End: 2024-10-10

## 2024-10-04 LAB
BACTERIAL VAGINOSIS DNA: NEGATIVE
C TRACH DNA SPEC QL NAA+PROBE: NOT DETECTED
CANDIDA GLABRATA/KRUSEI: NOT DETECTED
CANDIDA RRNA VAG QL PROBE: NOT DETECTED
N GONORRHOEA DNA SPEC QL NAA+PROBE: NOT DETECTED
TRICHOMONAS VAGINALIS: NOT DETECTED

## 2024-10-09 ENCOUNTER — PATIENT MESSAGE (OUTPATIENT)
Dept: OBSTETRICS AND GYNECOLOGY | Facility: CLINIC | Age: 24
End: 2024-10-09
Payer: COMMERCIAL

## 2024-10-09 DIAGNOSIS — R10.2 PELVIC PAIN: Primary | ICD-10-CM

## 2024-10-11 ENCOUNTER — OFFICE VISIT (OUTPATIENT)
Dept: GASTROENTEROLOGY | Facility: CLINIC | Age: 24
End: 2024-10-11
Payer: COMMERCIAL

## 2024-10-11 VITALS — BODY MASS INDEX: 37.74 KG/M2 | WEIGHT: 192.25 LBS | HEIGHT: 60 IN

## 2024-10-11 DIAGNOSIS — K13.79 MOUTH SORES: ICD-10-CM

## 2024-10-11 DIAGNOSIS — R14.0 ABDOMINAL BLOATING: ICD-10-CM

## 2024-10-11 DIAGNOSIS — K76.0 FATTY LIVER: ICD-10-CM

## 2024-10-11 DIAGNOSIS — Z87.19 HISTORY OF CONSTIPATION: ICD-10-CM

## 2024-10-11 DIAGNOSIS — R74.01 ELEVATED AST (SGOT): ICD-10-CM

## 2024-10-11 DIAGNOSIS — R12 HEARTBURN: ICD-10-CM

## 2024-10-11 DIAGNOSIS — R19.8 IRREGULAR BOWEL HABITS: ICD-10-CM

## 2024-10-11 DIAGNOSIS — R19.7 DIARRHEA, UNSPECIFIED TYPE: Primary | ICD-10-CM

## 2024-10-11 DIAGNOSIS — R11.0 NAUSEA: ICD-10-CM

## 2024-10-11 DIAGNOSIS — R10.30 LOWER ABDOMINAL PAIN: ICD-10-CM

## 2024-10-11 PROCEDURE — 99999 PR PBB SHADOW E&M-EST. PATIENT-LVL IV: CPT | Mod: PBBFAC,,,

## 2024-10-11 RX ORDER — DICYCLOMINE HYDROCHLORIDE 10 MG/1
10 CAPSULE ORAL
Qty: 120 CAPSULE | Refills: 0 | Status: SHIPPED | OUTPATIENT
Start: 2024-10-11 | End: 2024-11-10

## 2024-10-11 NOTE — PROGRESS NOTES
Subjective:       Patient ID: Ayaka Browne is a 24 y.o. female Body mass index is 37.54 kg/m².    Chief Complaint: Abdominal Pain, Diarrhea, and Mouth Lesions    This patient is new to me.  Referring Provider: Dr. Therese Tam for mouth ulcers, intermittent diarrhea, and intermittent abdominal pain.     Patient's  present and assisting with history.    GI Problem  The primary symptoms include weight loss (Intentional; patient currently on Zepbound), abdominal pain, nausea and diarrhea. Primary symptoms do not include fever, vomiting, melena, hematemesis, jaundice, hematochezia or dysuria.   The abdominal pain began more than 2 days ago. The abdominal pain has been gradually worsening (started in 2020) since its onset. The abdominal pain is located in the suprapubic region, LLQ and RLQ. The abdominal pain does not radiate. The severity of the abdominal pain is 3/10 (Constant bloating/gas pain that varies in severity; worsens after eating). The abdominal pain is relieved by bowel movements and being still (laying down; has tried Gas-X with mild improvement).   Nausea began more than 1 week ago (typically occurs around Zepbound injections; takes Zofran 4 mg p.r.n. once every 2 weeks with improvement). The nausea is associated with eating.   The diarrhea began more than 1 week ago (predominately diarrhea recently). The diarrhea is semi-solid (rated stool 6 on [place] scale). 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   The illness is also significant for bloating (has tried gas-X p.r.n. with no improvement) and constipation (Intermittent bouts of constipation with last episode about a month ago; during episodes of constipation she can skip up to 7 days  without a BM). The illness does not include dysphagia or odynophagia. Associated symptoms comments: Hx of recurrent mouth ulcers (usually present on lips, tongue, and gums) the last several years; flares occur every 1-2 months and then are present for about a month.  She reports having had multiple negative HSV 2 test but continues to have recurrent mouth ulcers.  Currently using magic mouthwash with short term relief; past treatments OTC pain relief medication and Tylenol. Past KEYONNA negative. Significant associated medical issues include GERD (Intermittent heartburn around times of Zepbound injections), liver disease (hx of fatty liver) and hemorrhoids. Associated medical issues do not include inflammatory bowel disease, gallstones, alcohol abuse, PUD, gastric bypass, bowel resection, irritable bowel syndrome or diverticulitis.     Review of Systems   Constitutional:  Positive for weight loss (Intentional; patient currently on Zepbound). Negative for fever.   HENT:  Negative for sore throat and trouble swallowing.    Respiratory:  Negative for cough, choking and shortness of breath.    Cardiovascular:  Negative for chest pain.   Gastrointestinal:  Positive for abdominal pain, bloating (has tried gas-X p.r.n. with no improvement), constipation (Intermittent bouts of constipation with last episode about a month ago; during episodes of constipation she can skip up to 7 days without a BM), diarrhea and nausea. Negative for abdominal distention, anal bleeding, blood in stool, dysphagia, hematemesis, hematochezia, jaundice, melena, rectal pain and vomiting.   Genitourinary:  Negative for dysuria.       No LMP recorded. Patient has had an implant.  History reviewed. No pertinent past medical history.  Past Surgical History:   Procedure Laterality Date    ANTERIOR CRUCIATE LIGAMENT REPAIR      TONSILLECTOMY  2006    TONSILLECTOMY AND ADENOIDECTOMY      WISDOM TOOTH EXTRACTION      WRIST SURGERY       Family History    Problem Relation Name Age of Onset    Hyperlipidemia Mother bev davis     Early death Father yunier davis     Colon cancer Maternal Grandmother      Ovarian cancer Paternal Grandmother julio     Cancer Paternal Grandmother julio     Breast cancer Neg Hx      Crohn's disease Neg Hx      Ulcerative colitis Neg Hx      Stomach cancer Neg Hx      Esophageal cancer Neg Hx       Social History     Tobacco Use    Smoking status: Never    Smokeless tobacco: Never   Substance Use Topics    Alcohol use: Yes     Alcohol/week: 1.0 standard drink of alcohol     Types: 1 Glasses of wine per week     Comment: Occ/socially    Drug use: Never     Wt Readings from Last 10 Encounters:   10/11/24 87.2 kg (192 lb 3.9 oz)   10/01/24 89.1 kg (196 lb 6.9 oz)   08/27/24 86.7 kg (191 lb 3.2 oz)   05/13/24 85.3 kg (188 lb 1.6 oz)   03/20/24 80.4 kg (177 lb 4.8 oz)   03/20/24 79.7 kg (175 lb 11.3 oz)   02/20/24 77.1 kg (170 lb)   01/26/24 76.7 kg (169 lb)   09/20/23 77.3 kg (170 lb 6.7 oz)   04/01/23 81.6 kg (180 lb)     Lab Results   Component Value Date    WBC 8.74 10/10/2024    HGB 13.2 10/10/2024    HCT 38.8 10/10/2024    MCV 87 10/10/2024     10/10/2024     CMP  Sodium   Date Value Ref Range Status   10/10/2024 137 136 - 145 mmol/L Final     Potassium   Date Value Ref Range Status   10/10/2024 4.5 3.5 - 5.1 mmol/L Final     Comment:     Anion Gap reference range revised on 4/28/2023  Specimen slightly hemolyzed       Chloride   Date Value Ref Range Status   10/10/2024 105 95 - 110 mmol/L Final     CO2   Date Value Ref Range Status   10/10/2024 23 22 - 31 mmol/L Final     Glucose   Date Value Ref Range Status   10/10/2024 83 70 - 110 mg/dL Final     Comment:     The ADA recommends the following guidelines for fasting glucose:    Normal:       less than 100 mg/dL    Prediabetes:  100 mg/dL to 125 mg/dL    Diabetes:     126 mg/dL or higher       BUN   Date Value Ref Range Status   10/10/2024 12 7 - 18 mg/dL Final     Creatinine    Date Value Ref Range Status   10/10/2024 0.53 0.50 - 1.40 mg/dL Final     Calcium   Date Value Ref Range Status   10/10/2024 9.1 8.4 - 10.2 mg/dL Final     Total Protein   Date Value Ref Range Status   10/10/2024 7.0 6.0 - 8.4 g/dL Final     Albumin   Date Value Ref Range Status   10/10/2024 4.3 3.5 - 5.2 g/dL Final     Total Bilirubin   Date Value Ref Range Status   10/10/2024 0.9 0.2 - 1.3 mg/dL Final     Alkaline Phosphatase   Date Value Ref Range Status   10/10/2024 56 38 - 145 U/L Final     AST   Date Value Ref Range Status   10/10/2024 40 (H) 14 - 36 U/L Final     ALT   Date Value Ref Range Status   10/10/2024 20 0 - 35 U/L Final     Anion Gap   Date Value Ref Range Status   10/10/2024 9 5 - 12 mmol/L Final     Comment:     Anion Gap reference range revised on 4/28/2023     Lab Results   Component Value Date    TSH 2.110 10/10/2024     Reviewed prior medical records including office visit with Dr. Tam 10/10/24, radiology report of transvaginal ultrasound 01/03/2023, HIDA scan 01/05/2021, abdominal ultrasound 01/05/2021, CT abdomen and pelvis 05/22/2019 & endoscopy history (see surgical history).    Objective:      Physical Exam  Vitals and nursing note reviewed.   Constitutional:       General: She is not in acute distress.     Appearance: Normal appearance. She is obese. She is not ill-appearing.   HENT:      Mouth/Throat:      Lips: Pink. No lesions.   Cardiovascular:      Rate and Rhythm: Normal rate.      Heart sounds: Normal heart sounds.   Pulmonary:      Effort: Pulmonary effort is normal. No respiratory distress.      Breath sounds: Normal breath sounds.   Abdominal:      General: Bowel sounds are normal. There is no distension or abdominal bruit. There are no signs of injury.      Palpations: Abdomen is soft. There is no shifting dullness, fluid wave, hepatomegaly, splenomegaly or mass.      Tenderness: There is abdominal tenderness in the right lower quadrant, suprapubic area and left lower  quadrant. There is no guarding or rebound. Negative signs include Avitia's sign, Rovsing's sign and McBurney's sign.   Skin:     General: Skin is warm and dry.      Coloration: Skin is not jaundiced or pale.   Neurological:      Mental Status: She is alert and oriented to person, place, and time.   Psychiatric:         Attention and Perception: Attention normal.         Mood and Affect: Mood normal.         Speech: Speech normal.         Behavior: Behavior normal.         Assessment:       1. Diarrhea, unspecified type    2. History of constipation    3. Irregular bowel habits    4. Lower abdominal pain    5. Mouth sores    6. Heartburn    7. Nausea    8. Abdominal bloating    9. Fatty liver    10. Elevated AST (SGOT)        Plan:       Diarrhea, unspecified type, History of constipation, & Irregular bowel habits  - schedule Colonoscopy, discussed procedure with the patient, including risks and benefits, patient verbalized understanding  - Recommended increase fiber in diet, especially soluble fiber since this can help bulk up the stool consistency and may help to slow down how fast the stool goes through the colon and can prevent diarrhea  - avoid lactose, alcohol, & caffeine  - avoid known triggers  -     Giardia / Cryptosporidum, EIA; Future; Expected date: 10/11/2024  -     Stool Exam-Ova,Cysts,Parasites; Future; Expected date: 10/11/2024  -     Clostridium difficile EIA; Future; Expected date: 10/11/2024  -     Stool culture; Future; Expected date: 10/11/2024  - START: dicyclomine (BENTYL) 10 MG capsule; Take 1 capsule (10 mg total) by mouth 4 (four) times daily before meals and nightly.  Dispense: 120 capsule; Refill: 0  -     H. pylori antigen, stool; Future; Expected date: 10/11/2024    Lower abdominal pain  - schedule Colonoscopy, discussed procedure with the patient, including risks and benefits, patient verbalized understanding  -     CT Abdomen Pelvis With IV Contrast Routine Oral Contrast; Future;  Expected date: 10/11/2024  -START: dicyclomine (BENTYL) 10 MG capsule; Take 1 capsule (10 mg total) by mouth 4 (four) times daily before meals and nightly.  Dispense: 120 capsule; Refill: 0    Mouth sores  - schedule EGD, discussed procedure with patient, including risks and benefits, patient verbalized understanding  -     Ambulatory referral/consult to ENT; Future; Expected date: 10/18/2024    Heartburn  - schedule EGD, discussed procedure with patient, including risks and benefits, patient verbalized understanding  -Avoid large meals, avoid eating within 2-3 hours of bedtime (avoid late night eating & lying down soon after eating), elevate head of bed if nocturnal symptoms are present, smoking cessation (if current smoker), & weight loss (if overweight).   -Avoid known foods which trigger reflux symptoms & to minimize/avoid high-fat foods, chocolate, caffeine, citrus, alcohol, & tomato products.  -Avoid/limit use of NSAID's, since they can cause GI upset, bleeding, and/or ulcers. If needed, take with food.  - typically occurs after Zepbound injections; consider decreasing dose or alternative medication  - consider starting PPI  -     H. pylori antigen, stool; Future; Expected date: 10/11/2024    Nausea  - schedule EGD, discussed procedure with patient, including risks and benefits, patient verbalized understanding  -Avoid/limit use of NSAID's, since they can cause GI upset, bleeding, and/or ulcers. If needed, take with food.  - typically occurs after Zepbound injections; consider decreasing dose or alternative medication  - continue Zofran 4 mg p.r.n.  -     H. pylori antigen, stool; Future; Expected date: 10/11/2024    Abdominal bloating  - schedule EGD, discussed procedure with patient, including risks and benefits, patient verbalized understanding  - schedule Colonoscopy, discussed procedure with the patient, including risks and benefits, patient verbalized understanding  - recommend OTC simethicone as  directed, such as Phazyme or Gas-x  - recommend low gas diet: Reduce or eliminate these foods from your diet: Broccoli, Cauliflower, [place] sprouts, Cabbage, Cooked dried beans, Carbonated beverages (sparkling water, soda, beer, champagne)  Other Causes Of Excess Gas Include:   1) EATING TOO FAST or TALKING WHILE YOU CHEW may cause you to swallow air. This increases the amount of gas in the stomach and may worsen your symptoms.  --> Chew each mouthful completely before swallowing. Take your time.  2) OVEREATING may increase the feeling of being bloated and cause more gas.  --> When you are full, stop eating.  3) CONSTIPATION can increase the amount of normal intestinal gas.  --> Avoid constipation by increasing the amount of fiber in your diet by including whole cereal grains, fresh vegetables (except those in the above list) and fresh fruits. High-fiber foods absorb water and carry it out of the body. When increasing the amount of fiber in your diet, you also need to increase the amount of water that you drink. You should drink at least eight 8-ounce glasses of water (two quarts) per day.  -     H. pylori antigen, stool; Future; Expected date: 10/11/2024  - celiac testing in process    Fatty liver & Elevated AST (SGOT)  For fatty liver recommend: low fat, low cholesterol diet, maintain good control of blood sugars and cholesterol levels, exercise, weight loss (if overweight), minimize/avoid alcohol and tylenol products, & follow-up with PCP for continued evaluation and management; if specialist is needed, recommend seeing hepatology.  - repeat LFTs in about 4 weeks  -     Hepatic Function Panel; Future; Expected date: 11/11/2024    Follow up in about 4 weeks (around 11/8/2024), or if symptoms worsen or fail to improve.      If no improvement in symptoms or symptoms worsen, call/follow-up at clinic or go to ER.        Total time spent on the encounter includes face to face time and non-face to face time preparing  to see the patient (eg, review of tests), Obtaining and/or reviewing separately obtained history, Documenting clinical information in the electronic or other health record, Independently interpreting results (not separately reported) and communicating results to the patient/family/caregiver, or Care coordination (not separately reported).     A dictation software program was used for this note. Please expect some simple typographical  errors in this note.

## 2024-10-18 ENCOUNTER — OFFICE VISIT (OUTPATIENT)
Dept: OTOLARYNGOLOGY | Facility: CLINIC | Age: 24
End: 2024-10-18
Payer: COMMERCIAL

## 2024-10-18 VITALS — WEIGHT: 195.75 LBS | BODY MASS INDEX: 38.43 KG/M2 | HEIGHT: 60 IN

## 2024-10-18 DIAGNOSIS — K13.79 ORAL PAIN: ICD-10-CM

## 2024-10-18 DIAGNOSIS — K13.79 MOUTH SORES: ICD-10-CM

## 2024-10-18 DIAGNOSIS — N76.6 GENITAL ULCER, FEMALE: ICD-10-CM

## 2024-10-18 DIAGNOSIS — K13.79 RECURRENT ORAL ULCERS: Primary | ICD-10-CM

## 2024-10-18 PROCEDURE — 99999 PR PBB SHADOW E&M-EST. PATIENT-LVL III: CPT | Mod: PBBFAC,,, | Performed by: STUDENT IN AN ORGANIZED HEALTH CARE EDUCATION/TRAINING PROGRAM

## 2024-10-18 RX ORDER — PREDNISOLONE 15 MG/5ML
0.4 SOLUTION ORAL 2 TIMES DAILY
Qty: 480 ML | Refills: 3 | Status: SHIPPED | OUTPATIENT
Start: 2024-10-18 | End: 2024-11-01

## 2024-10-18 NOTE — PROGRESS NOTES
Otolaryngology Clinic Note    Subjective:       Patient ID: Ayaka Browne is a 24 y.o. female.    Chief Complaint: Mouth Lesions      History of Present Illness: Ayaka Browne is a 24 y.o. female presenting with recurrent mouth ulcers for at least past 12 years, remembers this happening in middle school. Went to PCP 2 months ago. Got magic mouthwash did not help. Got worse, hard to eat. No specific pattern. Comes and goes. Lasts 1-2 weeks. Is all over gums, tongue, lips, buccal mucosa, not palate. Bilateral. Not ill when this happens typically. No cold sores. No one else in family gets this. Had negative KEYONNA and RF, sjogrens. CRP was mildly elevated. Mom has psoriasis. No dry mouth. No swallowing issues.   Getting scoped to rule out UC or Crohns. Saw GI. Ruled out celiac. Does occasionally get genital ulcers and herpes was negative. Was before sexually active. No eye or joint issues. No skin rash. Will happen every few months. More with period sometimes. Has GI issues being worked up. Denies reflux issues. Is on tirzepatide.       Past Surgical History:   Procedure Laterality Date    ANTERIOR CRUCIATE LIGAMENT REPAIR      TONSILLECTOMY  2006    TONSILLECTOMY AND ADENOIDECTOMY      WISDOM TOOTH EXTRACTION      WRIST SURGERY       No past medical history on file.  Social Drivers of Health     Tobacco Use: Low Risk  (10/11/2024)    Patient History     Smoking Tobacco Use: Never     Smokeless Tobacco Use: Never     Passive Exposure: Not on file   Alcohol Use: Alcohol Misuse (1/25/2024)    AUDIT-C     Frequency of Alcohol Consumption: Monthly or less     Average Number of Drinks: 3 or 4     Frequency of Binge Drinking: Less than monthly   Financial Resource Strain: Low Risk  (1/25/2024)    Overall Financial Resource Strain (CARDIA)     Difficulty of Paying Living Expenses: Not hard at all   Food Insecurity: No Food Insecurity (1/25/2024)    Hunger Vital Sign     Worried About Running Out of Food in the Last Year: Never  true     Ran Out of Food in the Last Year: Never true   Transportation Needs: No Transportation Needs (1/25/2024)    PRAPARE - Transportation     Lack of Transportation (Medical): No     Lack of Transportation (Non-Medical): No   Physical Activity: Inactive (1/25/2024)    Exercise Vital Sign     Days of Exercise per Week: 0 days     Minutes of Exercise per Session: 0 min   Stress: Stress Concern Present (1/25/2024)    [employer] [place] [employer] - Occupational Stress Questionnaire     Feeling of Stress : Rather much   Housing Stability: Low Risk  (1/25/2024)    Housing Stability Vital Sign     Unable to Pay for Housing in the Last Year: No     Number of Places Lived in the Last Year: 2     Unstable Housing in the Last Year: No   Depression: Low Risk  (1/26/2024)    Depression     Last PHQ-4: Flowsheet Data: 0   Utilities: Not on file   Health Literacy: Not on file   Social Isolation: Not on file     Review of patient's allergies indicates:   Allergen Reactions    Azithromycin Rash    Minocycline Swelling     Other reaction(s): lips, eyes and hands swellig  Swelling of face and lips  Swelling of face and lips      Codeine     Rice Other (See Comments)     Congestion  Congestion       Current Outpatient Medications   Medication Instructions    (Magic mouthwash) 1:1:1 diphenhydrAMINE(Benadryl) 12.5mg/5ml liq, aluminum & magnesium hydroxide-simethicone (Maalox), LIDOcaine viscous 2% 5 mLs, Swish & Spit, Every 2 hours PRN, for mouth sores    busPIRone (BUSPAR) 10 mg, Oral, 3 times daily PRN    dicyclomine (BENTYL) 10 mg, Oral, Before meals & nightly    estradioL (ESTRACE) 1 g, Vaginal, Daily    ondansetron (ZOFRAN) 4 mg, Oral, Every 8 hours PRN    prednisoLONE (PRELONE) 0.4 mg/kg, Oral, 2 times daily, Swish and spit    propranoloL (INDERAL) 10 mg, Oral, Daily PRN    tiZANidine (ZANAFLEX) 4 mg, Oral, Nightly PRN    ZEPBOUND 5 mg, Every 7 days         ENT ROS negative except as stated above.     Patient  answers are not available for this visit.            Objective:      There were no vitals filed for this visit.    General: NAD, well appearing  Eyes: Normal conjunctiva and lids  Face: symmetric, nerve intact  Nose: The nose is without any evidence of any deformity. The nasal mucosa is moist. The septum is midline. There is no evidence of septal hematoma. The turbinates are without abnormality.   Ears: The ears are with normal-appearing pinna. Examination of the canals is normal appearing bilaterally. There is no drainage or erythema noted. The tympanic membranes are normal appearing with pearly color, normal-appearing landmarks and normal light reflex. Hearing is grossly intact.  Mouth: ulcers to lips, mucosa within  left upper lip and small spot on posterior hard palate. The teeth are unremarkable. The gingivae are without any obvious evidence of infection or lesion. The oral mucosa is moist and pink. There are no obvious masses to the hard or soft palate.   Oropharynx: The uvula is midline.  The tongue is midline. The posterior pharynx is without erythema or exudate. The tonsils are normal appearing.  Salivary glands: The salivary glands are symmetric and not enlarged, no masses  Neck: No lymphadenopathy, trachea midline, thryoid not enlarged.  Psych: Normal mood and affect.   Neuro: Grossly intact  Speech: fluent    PROCEDURE:   Written consent obtained. Time out performed.     Biopsy/Excision of oral ulcer    Indication: oral ulcers    Findings: ulcer left wet upper lip biopsied. Also noted right upper lip dry area and palate ulcers.     Blood loss: < 1 ml    Specimen: lip ulcer    Anesthesia: 1% 1:100,000 lidocaine with epinephrine. Topical lidocaine.     Procedure: Alcohol applied then local injected into surgical site on left upper lip mucosa wet lip. Curved scissors used to dissect ellipse around ulcer as well as 2 superficial minor salivary glands. Silver nitrate used for cautery.  Closed with interrupted  plain gut 5.0 for skin.  Specimen sent for pathology.    Patient tolerated procedure well.       Assessment and Plan:       1. Recurrent oral ulcers    2. Mouth sores    3. Genital ulcer, female    4. Oral pain          Prednisone mouthwash PRN outbreaks- 2 weeks now- and refer to rheum. Suspect Bechet's. Will see what biopsy shows.   Tylenol, motrin, magic mouthwash PRN    RTC: will contact with biopsy results and see as needed. Suspect rheumatology will be more helpful.     Plan of care was discussed in detail with the patient, who agreed with the plan as above. All questions were answered in detail.     Meka Zarate MD  Otolaryngology

## 2024-10-22 ENCOUNTER — TELEPHONE (OUTPATIENT)
Dept: RHEUMATOLOGY | Facility: CLINIC | Age: 24
End: 2024-10-22
Payer: COMMERCIAL

## 2024-10-22 NOTE — TELEPHONE ENCOUNTER
Duplicate message  ----- Message from Nurse Hanna sent at 10/22/2024 10:04 AM CDT -----  Contact: PT    ----- Message -----  From: Karoline Fraire  Sent: 10/18/2024   2:16 PM CDT  To: [facility] Rheumatology Clinical Support Staff    Type:  NP Appointment Request    Caller is requesting a sooner appointment.      Name of Caller:PT   When is the first available appointment?N/A  Symptoms:  K13.79 (ICD-10-CM) - Mouth sores  Would the patient rather a call back or a response via [facility]? CALL   Best Call Back Number:[phone]  Additional Information: THANK YOU

## 2024-10-22 NOTE — TELEPHONE ENCOUNTER
Spoke to the pt and was able to get her a new pt appt with Dr Vázquez in December. States understanding  ----- Message from Carin Brothers MD sent at 10/21/2024 10:45 AM CDT -----  Regarding: RE: new patient referral  Hi yes, that would be helpful. Cyndi, can you please schedule her for a new patient appointment in Dec after her scope?  ----- Message -----  From: Meka Zarate MD  Sent: 10/18/2024   3:23 PM CDT  To: Carin Brothers MD  Subject: new patient referral                             Hey!!  Not urgent but would prefer if it was not like a year until she gets seen. Saw her for recurrent oral ulcers, very painful, happen often, does have some genital ulcers sometimes, but less often. Also GI issues, getting scoped in November. Could be a UC, crohn's picture but could be Bechet's. Started her on pred rinses for now and biopsied an ulcer today. If you wanted to wait for the scope to finish the GI workup and see her in December, may be a more complete picture.     Thanks,   Meka

## 2024-10-24 ENCOUNTER — LAB VISIT (OUTPATIENT)
Dept: LAB | Facility: HOSPITAL | Age: 24
End: 2024-10-24
Payer: COMMERCIAL

## 2024-10-24 DIAGNOSIS — R74.01 ELEVATED AST (SGOT): ICD-10-CM

## 2024-10-24 LAB
ALBUMIN SERPL BCP-MCNC: 3.8 G/DL (ref 3.5–5.2)
ALP SERPL-CCNC: 71 U/L (ref 40–150)
ALT SERPL W/O P-5'-P-CCNC: 31 U/L (ref 10–44)
AST SERPL-CCNC: 24 U/L (ref 10–40)
BILIRUB DIRECT SERPL-MCNC: 0.1 MG/DL (ref 0.1–0.3)
BILIRUB SERPL-MCNC: 0.4 MG/DL (ref 0.1–1)
PROT SERPL-MCNC: 6.7 G/DL (ref 6–8.4)

## 2024-10-24 PROCEDURE — 80076 HEPATIC FUNCTION PANEL: CPT

## 2024-10-24 PROCEDURE — 36415 COLL VENOUS BLD VENIPUNCTURE: CPT | Mod: PO

## 2024-10-31 ENCOUNTER — HOSPITAL ENCOUNTER (OUTPATIENT)
Dept: RADIOLOGY | Facility: HOSPITAL | Age: 24
Discharge: HOME | End: 2024-10-31
Payer: COMMERCIAL

## 2024-10-31 DIAGNOSIS — R10.30 LOWER ABDOMINAL PAIN: ICD-10-CM

## 2024-10-31 PROCEDURE — A9698 NON-RAD CONTRAST MATERIALNOC: HCPCS | Mod: PO

## 2024-10-31 PROCEDURE — 25500020 PHARM REV CODE 255: Mod: PO

## 2024-10-31 PROCEDURE — 74177 CT ABD & PELVIS W/CONTRAST: CPT | Mod: 26,,, | Performed by: RADIOLOGY

## 2024-10-31 PROCEDURE — 74177 CT ABD & PELVIS W/CONTRAST: CPT | Mod: TC,PO

## 2024-10-31 RX ADMIN — IOHEXOL 1000 ML: 12 SOLUTION ORAL at 02:10

## 2024-10-31 RX ADMIN — IOHEXOL 100 ML: 350 INJECTION, SOLUTION INTRAVENOUS at 02:10

## 2024-11-12 ENCOUNTER — TELEPHONE (OUTPATIENT)
Dept: GASTROENTEROLOGY | Facility: CLINIC | Age: 24
End: 2024-11-12
Payer: COMMERCIAL

## 2024-11-12 NOTE — TELEPHONE ENCOUNTER
Spoke with pt. Rescheduled procedure as Dr. White is not going to be in on day of procedure. Pt verbalized understanding to new date.

## 2024-11-14 ENCOUNTER — ANESTHESIA EVENT (OUTPATIENT)
Dept: ENDOSCOPY | Facility: HOSPITAL | Age: 24
End: 2024-11-14
Payer: COMMERCIAL

## 2024-11-14 ENCOUNTER — ANESTHESIA (OUTPATIENT)
Dept: ENDOSCOPY | Facility: HOSPITAL | Age: 24
End: 2024-11-14
Payer: COMMERCIAL

## 2024-11-14 ENCOUNTER — HOSPITAL ENCOUNTER (OUTPATIENT)
Facility: HOSPITAL | Age: 24
Discharge: HOME | End: 2024-11-14
Attending: INTERNAL MEDICINE | Admitting: STUDENT IN AN ORGANIZED HEALTH CARE EDUCATION/TRAINING PROGRAM
Payer: COMMERCIAL

## 2024-11-14 VITALS
RESPIRATION RATE: 17 BRPM | OXYGEN SATURATION: 98 % | DIASTOLIC BLOOD PRESSURE: 76 MMHG | WEIGHT: 200 LBS | BODY MASS INDEX: 39.27 KG/M2 | HEIGHT: 60 IN | HEART RATE: 80 BPM | TEMPERATURE: 98 F | SYSTOLIC BLOOD PRESSURE: 120 MMHG

## 2024-11-14 DIAGNOSIS — R10.9 ABDOMINAL PAIN: ICD-10-CM

## 2024-11-14 PROBLEM — R10.30 LOWER ABDOMINAL PAIN: Status: ACTIVE | Noted: 2024-11-14

## 2024-11-14 LAB
B-HCG UR QL: NEGATIVE
CTP QC/QA: YES

## 2024-11-14 PROCEDURE — 87427 SHIGA-LIKE TOXIN AG IA: CPT | Mod: 59 | Performed by: INTERNAL MEDICINE

## 2024-11-14 PROCEDURE — 87209 SMEAR COMPLEX STAIN: CPT | Performed by: INTERNAL MEDICINE

## 2024-11-14 PROCEDURE — 63600175 PHARM REV CODE 636 W HCPCS: Mod: PO | Performed by: NURSE ANESTHETIST, CERTIFIED REGISTERED

## 2024-11-14 PROCEDURE — 37000009 HC ANESTHESIA EA ADD 15 MINS: Mod: PO | Performed by: INTERNAL MEDICINE

## 2024-11-14 PROCEDURE — 87449 NOS EACH ORGANISM AG IA: CPT | Performed by: INTERNAL MEDICINE

## 2024-11-14 PROCEDURE — 27201012 HC FORCEPS, HOT/COLD, DISP: Mod: PO | Performed by: INTERNAL MEDICINE

## 2024-11-14 PROCEDURE — 37000008 HC ANESTHESIA 1ST 15 MINUTES: Mod: PO | Performed by: INTERNAL MEDICINE

## 2024-11-14 PROCEDURE — 81025 URINE PREGNANCY TEST: CPT | Mod: PO | Performed by: INTERNAL MEDICINE

## 2024-11-14 PROCEDURE — 45380 COLONOSCOPY AND BIOPSY: CPT | Mod: ,,, | Performed by: INTERNAL MEDICINE

## 2024-11-14 PROCEDURE — 87045 FECES CULTURE AEROBIC BACT: CPT | Performed by: INTERNAL MEDICINE

## 2024-11-14 PROCEDURE — 87046 STOOL CULTR AEROBIC BACT EA: CPT | Performed by: INTERNAL MEDICINE

## 2024-11-14 PROCEDURE — 88305 TISSUE EXAM BY PATHOLOGIST: CPT | Mod: 26,,, | Performed by: PATHOLOGY

## 2024-11-14 PROCEDURE — 45380 COLONOSCOPY AND BIOPSY: CPT | Mod: PO | Performed by: INTERNAL MEDICINE

## 2024-11-14 PROCEDURE — 88305 TISSUE EXAM BY PATHOLOGIST: CPT | Mod: 59,PO | Performed by: PATHOLOGY

## 2024-11-14 RX ORDER — SODIUM CHLORIDE, SODIUM LACTATE, POTASSIUM CHLORIDE, CALCIUM CHLORIDE 600; 310; 30; 20 MG/100ML; MG/100ML; MG/100ML; MG/100ML
INJECTION, SOLUTION INTRAVENOUS CONTINUOUS
Status: DISCONTINUED | OUTPATIENT
Start: 2024-11-14 | End: 2024-11-14 | Stop reason: HOSPADM

## 2024-11-14 RX ORDER — SODIUM CHLORIDE 0.9 % (FLUSH) 0.9 %
10 SYRINGE (ML) INJECTION
Status: DISCONTINUED | OUTPATIENT
Start: 2024-11-14 | End: 2024-11-14 | Stop reason: HOSPADM

## 2024-11-14 RX ORDER — LIDOCAINE HYDROCHLORIDE 20 MG/ML
INJECTION INTRAVENOUS
Status: DISCONTINUED | OUTPATIENT
Start: 2024-11-14 | End: 2024-11-14

## 2024-11-14 RX ORDER — PROPOFOL 10 MG/ML
VIAL (ML) INTRAVENOUS CONTINUOUS PRN
Status: DISCONTINUED | OUTPATIENT
Start: 2024-11-14 | End: 2024-11-14

## 2024-11-14 RX ORDER — METRONIDAZOLE 500 MG/1
500 TABLET ORAL
Qty: 21 TABLET | Refills: 0 | Status: SHIPPED | OUTPATIENT
Start: 2024-11-14 | End: 2024-11-21

## 2024-11-14 RX ORDER — PROPOFOL 10 MG/ML
VIAL (ML) INTRAVENOUS
Status: DISCONTINUED | OUTPATIENT
Start: 2024-11-14 | End: 2024-11-14

## 2024-11-14 RX ORDER — DICYCLOMINE HYDROCHLORIDE 10 MG/1
10 CAPSULE ORAL
Qty: 120 CAPSULE | Refills: 11 | Status: SHIPPED | OUTPATIENT
Start: 2024-11-14 | End: 2024-11-14

## 2024-11-14 RX ORDER — DICYCLOMINE HYDROCHLORIDE 10 MG/1
10 CAPSULE ORAL
Qty: 120 CAPSULE | Refills: 11 | Status: SHIPPED | OUTPATIENT
Start: 2024-11-14 | End: 2025-11-14

## 2024-11-14 RX ADMIN — PROPOFOL 50 MG: 10 INJECTION, EMULSION INTRAVENOUS at 09:11

## 2024-11-14 RX ADMIN — PROPOFOL 150 MCG/KG/MIN: 10 INJECTION, EMULSION INTRAVENOUS at 09:11

## 2024-11-14 RX ADMIN — LIDOCAINE HYDROCHLORIDE 100 MG: 20 INJECTION INTRAVENOUS at 09:11

## 2024-11-14 RX ADMIN — PROPOFOL 125 MG: 10 INJECTION, EMULSION INTRAVENOUS at 09:11

## 2024-11-14 NOTE — BRIEF OP NOTE
Discharge Note  Short Stay      SUMMARY     Admit Date: 11/14/2024    Attending Physician: Steve White Jr., MD     Discharge Physician: Steve White Jr., MD    Discharge Date: 11/14/2024 10:27 AM    Final Diagnosis: History of constipation [Z87.19]  Lower abdominal pain [R10.30]  Family history of colon cancer [Z80.0]    Impression:            - Scattered mild inflammation was found in the                          rectum, in the recto-sigmoid colon and in the                          sigmoid colon secondary to proctosigmoid                          ulcerative colitis. Fluid aspiration performed.                          - The descending colon, transverse colon,                          ascending colon and cecum are normal. Biopsied.                          - The examined portion of the ileum was normal.                          Biopsied.                          - Non-bleeding internal hemorrhoids.                          - The anus is normal.   Recommendation:        - Discharge patient to home.                          - Await pathology results.                          - Flagyl (metronidazole) 500 mg PO TID for 1 week.                          - Use Bentyl (dicyclomine) 10 mg PO QID 30 min AC.                          - Next, consider using Colazal (balsalazide) 3                          capsules PO BID.                          - Repeat colonoscopy in 3 years for surveillance.                          - Resume previous diet.                          - Continue present medications.                          - Patient has a contact number available for                          emergencies. The signs and symptoms of potential                          delayed complications were discussed with the                          patient. Return to normal activities tomorrow.                          Written discharge instructions were provided to                          the patient.                          -  Return to normal activities tomorrow.   Steve White MD   11/14/2024       Disposition: HOME OR SELF CARE    Patient Instructions:   Current Discharge Medication List        START taking these medications    Details   dicyclomine (BENTYL) 10 MG capsule Take 1 capsule (10 mg total) by mouth 4 (four) times daily before meals and nightly.  Qty: 120 capsule, Refills: 11      metroNIDAZOLE (FLAGYL) 500 MG tablet Take 1 tablet (500 mg total) by mouth 3 (three) times daily with meals. for 7 days  Qty: 21 tablet, Refills: 0           CONTINUE these medications which have NOT CHANGED    Details   (Magic mouthwash) 1:1:1 diphenhydrAMINE(Benadryl) 12.5mg/5ml liq, aluminum & magnesium hydroxide-simethicone (Maalox), LIDOcaine viscous 2% Swish and spit 5 mLs every 2 (two) hours as needed (oral pain). for mouth sores  Qty: 120 mL, Refills: 2    Associated Diagnoses: Mouth ulcers      ondansetron (ZOFRAN) 4 MG tablet Take 1 tablet (4 mg total) by mouth every 8 (eight) hours as needed for Nausea.  Qty: 30 tablet, Refills: 1      propranoloL (INDERAL) 10 MG tablet Take 1 tablet (10 mg total) by mouth daily as needed (anxiety).  Qty: 30 tablet, Refills: 1    Comments: .  Associated Diagnoses: Situational anxiety      tiZANidine (ZANAFLEX) 2 MG tablet Take 2 tablets (4 mg total) by mouth nightly as needed (headache).  Qty: 60 tablet, Refills: 3    Associated Diagnoses: Cervicogenic headache      busPIRone (BUSPAR) 10 MG tablet Take 1 tablet (10 mg total) by mouth 3 (three) times daily as needed (anxiety).  Qty: 90 tablet, Refills: 1    Associated Diagnoses: Anxiety      tirzepatide, weight loss, (ZEPBOUND) 5 mg/0.5 mL PnIj Inject 5 mg into the skin every 7 days.             Discharge Procedure Orders (must include Diet, Follow-up, Activity)    Follow Up:  Follow up with PCP as per your routine.  Please follow a high fiber diet.  Activity as tolerated.    No driving day of procedure.

## 2024-11-14 NOTE — TRANSFER OF CARE
Anesthesia Transfer of Care Note    Patient: Ayaka Browne    Procedure(s) Performed: Procedure(s) (LRB):  COLONOSCOPY (N/A)    Patient location: PACU    Anesthesia Type: general    Transport from OR: Transported from OR on room air with adequate spontaneous ventilation    Post pain: adequate analgesia    Post assessment: no apparent anesthetic complications and tolerated procedure well    Post vital signs: stable    Level of consciousness: responds to stimulation    Nausea/Vomiting: no nausea/vomiting    Complications: none    Transfer of care protocol was followed      Last vitals: Visit Vitals  BP (!) 142/83 (BP Location: Right arm, Patient Position: Lying)   Pulse 92   Temp 36.5 °C (97.7 °F) (Skin)   Resp 16   Ht 5' (1.524 m)   Wt 90.7 kg (200 lb)   LMP 09/30/2024 (Approximate)   SpO2 97%   Breastfeeding No   BMI 39.06 kg/m²

## 2024-11-14 NOTE — DISCHARGE INSTRUCTIONS
Recovery After Procedural Sedation (Adult)   You have been given medicine by vein to make you sleep during your surgery. This may have included both a pain medicine and sleeping medicine. Most of the effects have worn off. But you may still have some drowsiness for the next 6 to 8 hours.  Home care  Follow these guidelines when you get home:  For the next 8 hours, you should be watched by a responsible adult. This person should make sure your condition is not getting worse.  Don't drink any alcohol for the next 24 hours.  Don't drive, operate dangerous machinery, or make important business or personal decisions during the next 24 hours.  To prevent injury or falls, use caution when standing and walking for at least 24 hours after your procedure.  Note: Your healthcare provider may tell you not to take any medicine by mouth for pain or sleep in the next 4 hours. These medicines may react with the medicines you were given in the hospital. This could cause a much stronger response than usual.  Follow-up care  Follow up with your healthcare provider if you are not alert and back to your usual level of activity within 12 hours.  When to seek medical advice  Call your healthcare provider right away if any of these occur:  Drowsiness gets worse  Weakness or dizziness gets worse  Repeated vomiting  You can't be awakened  Fever  New rash  [employer] last reviewed this educational content on 9/1/2019  © 5343-3858 [employer], Submittable. [address] [place] [address], [place] [id number]. All rights reserved. This information is not intended as a substitute for professional medical care. Always follow your healthcare professional's instructions         High-Fiber Diet  Fiber is in fruits, vegetables, cereals, and grains. Fiber passes through your body undigested. A high-fiber diet helps food move through your intestinal tract. The added bulk is helpful in preventing constipation. In people with diverticulosis, fiber helps clean out  the pouches along the colon wall. It also prevents new pouches from forming. A high-fiber diet reduces the risk of colon cancer. It also lowers blood cholesterol and prevents high blood sugar in people with diabetes.    The fiber-rich foods listed below should be part of your diet. If you are not used to high-fiber foods, start with 1 or 2 foods from this list. Every 3 to 4 days add a new one to your diet. Do this until you are eating 4 high-fiber foods per day. This should give you 20 to 35 grams of fiber a day. It is also important to drink a lot of water when you are on this diet. You should have 6 to 8 glasses of water a day. Water makes the fiber swell and increases the benefit.  Foods high in dietary fiber  The following foods are high in dietary fiber:  Breads. Breads made with 100% whole-wheat flour; elisabeth, wheat, or rye crackers; whole-grain tortillas, bran muffins.  Cereals. Whole-grain and bran cereals with bran (shredded wheat, wheat flakes, raisin bran, corn bran); oatmeal, rolled oats, granola, and brown rice.  Fruits. Fresh fruits and their edible skins (pears, prunes, raisins, berries, apples, and apricots); bananas, citrus fruit, mangoes, pineapple; and prune juice.  Nuts. Any nuts and seeds.  Vegetables. Best served raw or lightly cooked. All types, especially: green peas, celery, eggplant, potatoes, spinach, broccoli, [place] sprouts, winter squash, carrots, cauliflower, soybeans, lentils, and fresh and dried beans of all kinds.  Other. Popcorn, any spices.  Date Last Reviewed: 8/1/2016  © 1291-1812 [employer]. [address] [place] [address], [place] [address]. All rights reserved. This information is not intended as a substitute for professional medical care. Always follow your healthcare professional's instructions.

## 2024-11-14 NOTE — H&P
History & Physical - Short Stay  Gastroenterology      SUBJECTIVE:     Procedure: Colonoscopy    Chief Complaint/Indication for Procedure: Abdominal pain, nausea and diarrhea.  Mouth Lesions    History of Present Illness:  See recent GI OV note:  Office Visit   10/11/2024  [facility] - G[facility]enterology       Era Gibbs NP  Gastroenterology Diarrhea, unspecified type +9 more  Dx Abdominal Pain  Diarrhea  Mouth Lesions; Referred by Therese Tam MD  Reason for Visit     Progress Notes    Era Gibbs NP at 10/11/2024  9:30 AM    Status: Signed   Expand All Collapse All  Subjective:         Subjective  Patient ID: Ayaka Browne is a 24 y.o. female Body mass index is 37.54 kg/m².     Chief Complaint: Abdominal Pain, Diarrhea, and Mouth Lesions     This patient is new to me.  Referring Provider: Dr. Therese Tam for mouth ulcers, intermittent diarrhea, and intermittent abdominal pain.     Patient's  present and assisting with history.     GI Problem  The primary symptoms include weight loss (Intentional; patient currently on Zepbound), abdominal pain, nausea and diarrhea. Primary symptoms do not include fever, vomiting, melena, hematemesis, jaundice, hematochezia or dysuria.   The abdominal pain began more than 2 days ago. The abdominal pain has been gradually worsening (started in 2020) since its onset. The abdominal pain is located in the suprapubic region, LLQ and RLQ. The abdominal pain does not radiate. The severity of the abdominal pain is 3/10 (Constant bloating/gas pain that varies in severity; worsens after eating). The abdominal pain is relieved by bowel movements and being still (laying down; has tried Gas-X with mild improvement).   Nausea began more than 1 week ago (typically occurs around Zepbound injections; takes Zofran 4 mg p.r.n. once every 2 weeks with improvement). The nausea is associated with eating.   The diarrhea began more than 1 week ago (predominately  diarrhea recently). The diarrhea is semi-solid (rated stool 6 on Gilliam scale). 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   The illness is also significant for bloating (has tried gas-X p.r.n. with no improvement) and constipation (Intermittent bouts of constipation with last episode about a month ago; during episodes of constipation she can skip up to 7 days without a BM). The illness does not include dysphagia or odynophagia. Associated symptoms comments: Hx of recurrent mouth ulcers (usually present on lips, tongue, and gums) the last several years; flares occur every 1-2 months and then are present for about a month.  She reports having had multiple negative HSV 2 test but continues to have recurrent mouth ulcers.  Currently using magic mouthwash with short term relief; past treatments OTC pain relief medication and Tylenol. Past KEYONNA negative. Significant associated medical issues include GERD (Intermittent heartburn around times of Zepbound injections), liver disease (hx of fatty liver) and hemorrhoids. Associated medical issues do not include inflammatory bowel disease, gallstones, alcohol abuse, PUD, gastric bypass, bowel resection, irritable bowel syndrome or diverticulitis.      Review of Systems   Constitutional:  Positive for weight loss (Intentional; patient currently on Zepbound). Negative for fever.   HENT:  Negative for sore throat and trouble swallowing.    Respiratory:  Negative for cough, choking and shortness of breath.    Cardiovascular:  Negative for chest pain.   Gastrointestinal:  Positive for abdominal pain, bloating (has tried gas-X p.r.n. with no improvement), constipation (Intermittent bouts of constipation with last episode about a  month ago; during episodes of constipation she can skip up to 7 days without a BM), diarrhea and nausea. Negative for abdominal distention, anal bleeding, blood in stool, dysphagia, hematemesis, hematochezia, jaundice, melena, rectal pain and vomiting.       Wt Readings from Last 15 Encounters:   11/14/24 90.7 kg (200 lb)   11/07/24 91 kg (200 lb 11.2 oz)   10/18/24 88.8 kg (195 lb 12.3 oz)   10/11/24 87.2 kg (192 lb 3.9 oz)   10/01/24 89.1 kg (196 lb 6.9 oz)   08/27/24 86.7 kg (191 lb 3.2 oz)   05/13/24 85.3 kg (188 lb 1.6 oz)   03/20/24 80.4 kg (177 lb 4.8 oz)   03/20/24 79.7 kg (175 lb 11.3 oz)   02/20/24 77.1 kg (170 lb)   01/26/24 76.7 kg (169 lb)   09/20/23 77.3 kg (170 lb 6.7 oz)   04/01/23 81.6 kg (180 lb)       Assessment:      Assessment  1. Diarrhea, unspecified type    2. History of constipation    3. Irregular bowel habits    4. Lower abdominal pain    5. Mouth sores    6. Heartburn    7. Nausea    8. Abdominal bloating    9. Fatty liver    10. Elevated AST (SGOT)          Plan:      Plan  Diarrhea, unspecified type, History of constipation, & Irregular bowel habits  - schedule Colonoscopy, discussed procedure with the patient, including risks and benefits, patient verbalized understanding  - Recommended increase fiber in diet, especially soluble fiber since this can help bulk up the stool consistency and may help to slow down how fast the stool goes through the colon and can prevent diarrhea  - avoid lactose, alcohol, & caffeine  - avoid known triggers  -     Giardia / Cryptosporidum, EIA; Future; Expected date: 10/11/2024  -     Stool Exam-Ova,Cysts,Parasites; Future; Expected date: 10/11/2024  -     Clostridium difficile EIA; Future; Expected date: 10/11/2024  -     Stool culture; Future; Expected date: 10/11/2024  - START: dicyclomine (BENTYL) 10 MG capsule; Take 1 capsule (10 mg total) by mouth 4 (four) times daily before meals and nightly.  Dispense: 120 capsule; Refill: 0  -     H. pylori  antigen, stool; Future; Expected date: 10/11/2024     Lower abdominal pain  - schedule Colonoscopy, discussed procedure with the patient, including risks and benefits, patient verbalized understanding  -     CT Abdomen Pelvis With IV Contrast Routine Oral Contrast; Future; Expected date: 10/11/2024  -START: dicyclomine (BENTYL) 10 MG capsule; Take 1 capsule (10 mg total) by mouth 4 (four) times daily before meals and nightly.  Dispense: 120 capsule; Refill: 0     Mouth sores  - schedule EGD, discussed procedure with patient, including risks and benefits, patient verbalized understanding  -     Ambulatory referral/consult to ENT; Future; Expected date: 10/18/2024     Heartburn  - schedule EGD, discussed procedure with patient, including risks and benefits, patient verbalized understanding  -Avoid large meals, avoid eating within 2-3 hours of bedtime (avoid late night eating & lying down soon after eating), elevate head of bed if nocturnal symptoms are present, smoking cessation (if current smoker), & weight loss (if overweight).   -Avoid known foods which trigger reflux symptoms & to minimize/avoid high-fat foods, chocolate, caffeine, citrus, alcohol, & tomato products.  -Avoid/limit use of NSAID's, since they can cause GI upset, bleeding, and/or ulcers. If needed, take with food.  - typically occurs after Zepbound injections; consider decreasing dose or alternative medication  - consider starting PPI  -     H. pylori antigen, stool; Future; Expected date: 10/11/2024     Nausea  - schedule EGD, discussed procedure with patient, including risks and benefits, patient verbalized understanding  -Avoid/limit use of NSAID's, since they can cause GI upset, bleeding, and/or ulcers. If needed, take with food.  - typically occurs after Zepbound injections; consider decreasing dose or alternative medication  - continue Zofran 4 mg p.r.n.  -     H. pylori antigen, stool; Future; Expected date: 10/11/2024     Abdominal  bloating  - schedule EGD, discussed procedure with patient, including risks and benefits, patient verbalized understanding  - schedule Colonoscopy, discussed procedure with the patient, including risks and benefits, patient verbalized understanding  - recommend OTC simethicone as directed, such as Phazyme or Gas-x  - recommend low gas diet: Reduce or eliminate these foods from your diet: Broccoli, Cauliflower, [place] sprouts, Cabbage, Cooked dried beans, Carbonated beverages (sparkling water, soda, beer, champagne)  Other Causes Of Excess Gas Include:   1) EATING TOO FAST or TALKING WHILE YOU CHEW may cause you to swallow air. This increases the amount of gas in the stomach and may worsen your symptoms.  --> Chew each mouthful completely before swallowing. Take your time.  2) OVEREATING may increase the feeling of being bloated and cause more gas.  --> When you are full, stop eating.  3) CONSTIPATION can increase the amount of normal intestinal gas.  --> Avoid constipation by increasing the amount of fiber in your diet by including whole cereal grains, fresh vegetables (except those in the above list) and fresh fruits. High-fiber foods absorb water and carry it out of the body. When increasing the amount of fiber in your diet, you also need to increase the amount of water that you drink. You should drink at least eight 8-ounce glasses of water (two quarts) per day.  -     H. pylori antigen, stool; Future; Expected date: 10/11/2024  - celiac testing in process     Fatty liver & Elevated AST (SGOT)  For fatty liver recommend: low fat, low cholesterol diet, maintain good control of blood sugars and cholesterol levels, exercise, weight loss (if overweight), minimize/avoid alcohol and tylenol products, & follow-up with PCP for continued evaluation and management; if specialist is needed, recommend seeing hepatology.  - repeat LFTs in about 4 weeks  -     Hepatic Function Panel; Future; Expected date: 11/11/2024              PTA Medications   Medication Sig    (Magic mouthwash) 1:1:1 diphenhydrAMINE(Benadryl) 12.5mg/5ml liq, aluminum & magnesium hydroxide-simethicone (Maalox), LIDOcaine viscous 2% Swish and spit 5 mLs every 2 (two) hours as needed (oral pain). for mouth sores    ondansetron (ZOFRAN) 4 MG tablet Take 1 tablet (4 mg total) by mouth every 8 (eight) hours as needed for Nausea.    propranoloL (INDERAL) 10 MG tablet Take 1 tablet (10 mg total) by mouth daily as needed (anxiety).    tiZANidine (ZANAFLEX) 2 MG tablet Take 2 tablets (4 mg total) by mouth nightly as needed (headache).    busPIRone (BUSPAR) 10 MG tablet Take 1 tablet (10 mg total) by mouth 3 (three) times daily as needed (anxiety).    tirzepatide, weight loss, (ZEPBOUND) 5 mg/0.5 mL PnIj Inject 5 mg into the skin every 7 days.       Review of patient's allergies indicates:   Allergen Reactions    Azithromycin Rash    Minocycline Swelling     Other reaction(s): lips, eyes and hands swellig  Swelling of face and lips  Swelling of face and lips      Codeine     [facility] Other (See Comments)     Congestion  Congestion          History reviewed. No pertinent past medical history.  Past Surgical History:   Procedure Laterality Date    ANTERIOR CRUCIATE LIGAMENT REPAIR      TONSILLECTOMY  2006    TONSILLECTOMY AND ADENOIDECTOMY      WISDOM TOOTH EXTRACTION      WRIST SURGERY       Family History   Problem Relation Name Age of Onset    Hyperlipidemia Mother bev davis     Early death Father yunier davis     Colon cancer Maternal Grandmother      Ovarian cancer Paternal Grandmother julio     Cancer Paternal Grandmother julio     Breast cancer Neg Hx      Crohn's disease Neg Hx      Ulcerative colitis Neg Hx      Stomach cancer Neg Hx      Esophageal cancer Neg Hx       Social History     Tobacco Use    Smoking status: Never    Smokeless tobacco: Never   Substance Use Topics    Alcohol use: Yes     Alcohol/week: 1.0 standard drink of alcohol     Types: 1 Glasses of  wine per week     Comment: Occ/socially    Drug use: Never         OBJECTIVE:     Vital Signs (Most Recent)  Temp: 97.7 °F (36.5 °C) (11/14/24 0854)  Pulse: 92 (11/14/24 0854)  Resp: 16 (11/14/24 0854)  BP: (!) 142/83 (11/14/24 0854)  SpO2: 97 % (11/14/24 0854)    Physical Exam:  : Ht: 5' (152.4 cm)   Wt: 90.7 kg   BMI: 39.06 kg/m² .                                                       GENERAL:  Comfortable, in no acute distress.                                 HEENT EXAM:  Nonicteric.  No adenopathy.  Oropharynx is clear.               NECK:  Supple.                                                               LUNGS:  Clear.                                                               CARDIAC:  Regular rate and rhythm.  S1, S2.  No murmur.                      ABDOMEN:  Obese.  Soft, positive bowel sounds, nontender.  No hepatosplenomegaly or masses.  No rebound or guarding.                                             EXTREMITIES:  No edema.     MENTAL STATUS:  Alert and oriented.    ASSESSMENT/PLAN:     Assessment: Abdominal pain, nausea and diarrhea.  Mouth Lesions    Plan: Colonoscopy    Anesthesia Plan:   MAC / General Anaesthesia    ASA Grade: ASA 2 - Patient with mild systemic disease with no functional limitations    MALLAMPATI SCORE: II (hard and soft palate, upper portion of tonsils anduvula visible)

## 2024-11-14 NOTE — PLAN OF CARE
Plan of care discussed with patient. Procedure education provided. All questions and concerns answered. Patient verbalizes understanding.      Dr. Guevara made aware that patient has permanent bracelets x2 to (R) wrist, explained risk to patient and agreeable to tap or remove if needed.

## 2024-11-14 NOTE — [employer] PATIENT INSTRUCTIONS
Discharge Summary/Instructions for after Colonoscopy with   Biopsy/Polypectomy  Ayaka Browne    Thursday, November 14, 2024  Steve White MD  RESTRICTIONS ON ACTIVITY:  - Do not drive a car or operate machinery until the day after the procedure.      - The following day: return to full activity including work.  - For  3 days: No heavy lifting, straining or running.  - Diet: You can have solid foods, but no gassy foods (i.e. beans, broccoli,   cabbage, etc).  TREATMENT FOR COMMON SIDE EFFECTS:  - Mild abdominal pain and bloating or excessive gas: rest, eat lightly and   use a heating pad.  SYMPTOMS TO WATCH FOR AND REPORT TO YOUR PHYSICIAN:  1. Severe abdominal pain.  2. Fever within 24 hours after a procedure.  3. A large amount of rectal bleeding. (A small amount of blood from the   rectum is not serious, especially if hemorrhoids are present.  3.  Because air was put into your colon during the procedure, expelling   large amounts of air from your rectum is normal.  4.  You may not have a bowel movement for 1-3 days because of the   colonoscopy prep.  This is normal.  5.  Call immediately if you notice any of the following:   Chills and/or fever over 101   Persistent vomiting   Severe abdominal pain, other than gas cramps   Severe chest pain   Black, tarry stools   Any bleeding - exceeding one tablespoon  Your doctor recommends these additional instructions:  You are being discharged to home.   We are waiting for your pathology and culture results.   Take Flagyl (metronidazole) 500 mg by mouth three times a day for one week.     Take Bentyl (dicyclomine) 10 mg by mouth four times per day 30 minutes   before meals and at bedtime, to ease bloating and diarrhea.   Your physician has recommended a repeat colonoscopy in three years for   surveillance.  None  If you have any questions or problems, please call your physician.  EMERGENCY PHONE NUMBER: [phone]  LAB RESULTS: Call in two (2) weeks for lab  results, [phone]  ___________________________________________  Nurse Signature  ___________________________________________  Patient/Designated Responsible Party Signature  Steve White MD  11/14/2024 10:24:50 AM  This report has been verified and signed electronically.  Dear patient,  As a result of recent federal legislation (The Federal Cures Act), you may   receive lab or pathology results from your procedure in your [facility]   account before your physician is able to contact you. Your physician or   their representative will relay the results to you with their   recommendations at their soonest availability.  Thank you.  [employer]ATION

## 2024-11-15 LAB
FINAL PATHOLOGIC DIAGNOSIS: NORMAL
GROSS: NORMAL
Lab: NORMAL

## 2024-11-15 NOTE — ANESTHESIA POSTPROCEDURE EVALUATION
Anesthesia Post Evaluation    Patient: Ayaka Browne    Procedure(s) Performed: Procedure(s) (LRB):  COLONOSCOPY (N/A)    Final Anesthesia Type: general      Patient location during evaluation: PACU  Patient participation: Yes- Able to Participate  Level of consciousness: awake and alert and oriented  Post-procedure vital signs: reviewed and stable  Pain management: adequate  Airway patency: patent    PONV status at discharge: No PONV  Anesthetic complications: no      Cardiovascular status: blood pressure returned to baseline  Respiratory status: unassisted, spontaneous ventilation and room air  Hydration status: euvolemic  Follow-up not needed.              Vitals Value Taken Time   /76 11/14/24 1033   Temp  11/15/24 0949   Pulse 80 11/14/24 1033   Resp 17 11/14/24 1033   SpO2 98 % 11/14/24 1033         Event Time   Out of Recovery 10:40:58         Pain/Yessy Score: Yessy Score: 10 (11/14/2024 10:33 AM)

## 2024-11-16 LAB
BACTERIA STL CULT: NORMAL
E COLI SXT1 STL QL IA: NEGATIVE
E COLI SXT2 STL QL IA: NEGATIVE

## 2024-11-19 ENCOUNTER — OFFICE VISIT (OUTPATIENT)
Dept: OBSTETRICS AND GYNECOLOGY | Facility: CLINIC | Age: 24
End: 2024-11-19
Payer: COMMERCIAL

## 2024-11-19 VITALS
BODY MASS INDEX: 39.31 KG/M2 | SYSTOLIC BLOOD PRESSURE: 122 MMHG | DIASTOLIC BLOOD PRESSURE: 82 MMHG | WEIGHT: 201.25 LBS

## 2024-11-19 DIAGNOSIS — Z30.432 ENCOUNTER FOR IUD REMOVAL: Primary | ICD-10-CM

## 2024-11-19 PROCEDURE — 99999 PR PBB SHADOW E&M-EST. PATIENT-LVL III: CPT | Mod: PBBFAC,,, | Performed by: STUDENT IN AN ORGANIZED HEALTH CARE EDUCATION/TRAINING PROGRAM

## 2024-11-19 PROCEDURE — 99499 UNLISTED E&M SERVICE: CPT | Mod: S$GLB,,, | Performed by: STUDENT IN AN ORGANIZED HEALTH CARE EDUCATION/TRAINING PROGRAM

## 2024-11-19 PROCEDURE — 58301 REMOVE INTRAUTERINE DEVICE: CPT | Mod: S$GLB,,, | Performed by: STUDENT IN AN ORGANIZED HEALTH CARE EDUCATION/TRAINING PROGRAM

## 2024-11-19 NOTE — PROCEDURES
Removal of IUD    Date/Time: 11/19/2024 9:20 AM    Performed by: Cait Almendarez MD  Authorized by: Cait Almendarez MD    Consent given by:  Patient  Procedure risks and benefits discussed: yes    Patient questions answered: yes    Patient agrees, verbalizes understanding, and wants to proceed: yes    Educational handouts given: yes    Instructions and paperwork completed: yes    Implant grasped by: ring forceps  Other reason for removal:  TTC, vaginal dryness  Removed with no complications: yes      
Ears: no ear pain and no hearing problems.Nose: no nasal congestion and no nasal drainage.Mouth/Throat: no dysphagia, no hoarseness and no throat pain.Neck: no lumps, no pain, no stiffness and no swollen glands.

## 2024-11-20 LAB — O+P STL MICRO: NORMAL

## 2024-12-03 ENCOUNTER — TELEPHONE (OUTPATIENT)
Dept: RHEUMATOLOGY | Facility: CLINIC | Age: 24
End: 2024-12-03
Payer: COMMERCIAL

## 2024-12-03 NOTE — TELEPHONE ENCOUNTER
Spoke to the pt and was able to reschedule until she is get her scopes done. States understanding  ----- Message from Lilly sent at 12/3/2024  1:39 PM CST -----  Contact: pt [phone]  Type: Needs Medical Advice  Who Called:  Pt     Best Call Back Number: [phone]    Additional Information: Pt not able to get her scopes done before appt tomorrow. Pt asking if she still needs to come to appt.Pls call back and advise

## 2024-12-09 RX ORDER — PINDOLOL 5 MG/1
5 TABLET ORAL
Status: ON HOLD | COMMUNITY
End: 2024-12-13 | Stop reason: HOSPADM

## 2024-12-09 RX ORDER — PHENAZOPYRIDINE HYDROCHLORIDE 200 MG/1
200 TABLET, FILM COATED ORAL
Status: ON HOLD | COMMUNITY
End: 2024-12-13 | Stop reason: HOSPADM

## 2024-12-09 RX ORDER — BENZONATATE 200 MG/1
1 CAPSULE ORAL 3 TIMES DAILY PRN
COMMUNITY

## 2024-12-09 RX ORDER — NALTREXONE HYDROCHLORIDE 50 MG/1
50 TABLET, FILM COATED ORAL DAILY
COMMUNITY

## 2024-12-09 RX ORDER — NITROFURANTOIN 25; 75 MG/1; MG/1
1 CAPSULE ORAL 2 TIMES DAILY
Status: ON HOLD | COMMUNITY
End: 2024-12-13 | Stop reason: HOSPADM

## 2024-12-09 RX ORDER — BUPROPION HYDROCHLORIDE 150 MG/1
150 TABLET ORAL DAILY
COMMUNITY

## 2024-12-09 RX ORDER — METFORMIN HYDROCHLORIDE 500 MG/1
1 TABLET ORAL 2 TIMES DAILY
COMMUNITY

## 2024-12-09 RX ORDER — ALBUTEROL SULFATE 90 UG/1
2 INHALANT RESPIRATORY (INHALATION) EVERY 4 HOURS PRN
COMMUNITY

## 2024-12-10 ENCOUNTER — TELEPHONE (OUTPATIENT)
Dept: GASTROENTEROLOGY | Facility: CLINIC | Age: 24
End: 2024-12-10
Payer: COMMERCIAL

## 2024-12-10 NOTE — TELEPHONE ENCOUNTER
My chart message sent to patient.    Steve White Jr., MD [facility] Staff  Please let patient know.  The colon biopsies were all read as normal.  The stool cultures were negative.        Rec remains the same.   Take meds as directed.        Please:  set her up for f/u in 4-6 weeks, to see Era.    An appointment was set up for you with Era Gibbs NP     1/13/2025 10:30 AM (30 min)  [facility]   EST PATIENT - [facility]STR[facility] ([facility])   Please let us know if this will work for you.  Kindly,  MIKE Walker

## 2024-12-12 ENCOUNTER — ANESTHESIA EVENT (OUTPATIENT)
Dept: ENDOSCOPY | Facility: HOSPITAL | Age: 24
End: 2024-12-12
Payer: COMMERCIAL

## 2024-12-13 ENCOUNTER — HOSPITAL ENCOUNTER (OUTPATIENT)
Facility: HOSPITAL | Age: 24
Discharge: HOME | End: 2024-12-13
Attending: INTERNAL MEDICINE | Admitting: STUDENT IN AN ORGANIZED HEALTH CARE EDUCATION/TRAINING PROGRAM
Payer: COMMERCIAL

## 2024-12-13 ENCOUNTER — ANESTHESIA (OUTPATIENT)
Dept: ENDOSCOPY | Facility: HOSPITAL | Age: 24
End: 2024-12-13
Payer: COMMERCIAL

## 2024-12-13 DIAGNOSIS — R10.9 ABDOMINAL PAIN: ICD-10-CM

## 2024-12-13 PROBLEM — R14.2 BELCHING: Status: ACTIVE | Noted: 2024-12-13

## 2024-12-13 LAB
B-HCG UR QL: NEGATIVE
CTP QC/QA: YES

## 2024-12-13 PROCEDURE — D9220A PRA ANESTHESIA: Mod: ANES,,, | Performed by: ANESTHESIOLOGY

## 2024-12-13 PROCEDURE — 88312 SPECIAL STAINS GROUP 1: CPT | Mod: 59,PO | Performed by: PATHOLOGY

## 2024-12-13 PROCEDURE — 43239 EGD BIOPSY SINGLE/MULTIPLE: CPT | Mod: ,,, | Performed by: INTERNAL MEDICINE

## 2024-12-13 PROCEDURE — 27201012 HC FORCEPS, HOT/COLD, DISP: Mod: PO | Performed by: INTERNAL MEDICINE

## 2024-12-13 PROCEDURE — D9220A PRA ANESTHESIA: Mod: CRNA,,, | Performed by: NURSE ANESTHETIST, CERTIFIED REGISTERED

## 2024-12-13 PROCEDURE — 37000008 HC ANESTHESIA 1ST 15 MINUTES: Mod: PO | Performed by: INTERNAL MEDICINE

## 2024-12-13 PROCEDURE — 63600175 PHARM REV CODE 636 W HCPCS: Mod: PO | Performed by: NURSE ANESTHETIST, CERTIFIED REGISTERED

## 2024-12-13 PROCEDURE — 88305 TISSUE EXAM BY PATHOLOGIST: CPT | Mod: 59,PO | Performed by: PATHOLOGY

## 2024-12-13 PROCEDURE — 63600175 PHARM REV CODE 636 W HCPCS: Mod: PO | Performed by: INTERNAL MEDICINE

## 2024-12-13 PROCEDURE — 37000009 HC ANESTHESIA EA ADD 15 MINS: Mod: PO | Performed by: INTERNAL MEDICINE

## 2024-12-13 PROCEDURE — 43239 EGD BIOPSY SINGLE/MULTIPLE: CPT | Mod: PO | Performed by: INTERNAL MEDICINE

## 2024-12-13 PROCEDURE — 88342 IMHCHEM/IMCYTCHM 1ST ANTB: CPT | Mod: PO | Performed by: PATHOLOGY

## 2024-12-13 PROCEDURE — 81025 URINE PREGNANCY TEST: CPT | Mod: PO | Performed by: INTERNAL MEDICINE

## 2024-12-13 RX ORDER — LIDOCAINE HYDROCHLORIDE 20 MG/ML
INJECTION INTRAVENOUS
Status: DISCONTINUED | OUTPATIENT
Start: 2024-12-13 | End: 2024-12-13

## 2024-12-13 RX ORDER — PROPOFOL 10 MG/ML
VIAL (ML) INTRAVENOUS
Status: DISCONTINUED | OUTPATIENT
Start: 2024-12-13 | End: 2024-12-13

## 2024-12-13 RX ORDER — SODIUM CHLORIDE 0.9 % (FLUSH) 0.9 %
10 SYRINGE (ML) INJECTION
Status: DISCONTINUED | OUTPATIENT
Start: 2024-12-13 | End: 2024-12-13 | Stop reason: HOSPADM

## 2024-12-13 RX ORDER — OMEPRAZOLE 40 MG/1
40 CAPSULE, DELAYED RELEASE ORAL
Qty: 90 CAPSULE | Refills: 3 | Status: SHIPPED | OUTPATIENT
Start: 2024-12-13 | End: 2025-12-13

## 2024-12-13 RX ORDER — FENTANYL CITRATE 50 UG/ML
INJECTION, SOLUTION INTRAMUSCULAR; INTRAVENOUS
Status: DISCONTINUED | OUTPATIENT
Start: 2024-12-13 | End: 2024-12-13

## 2024-12-13 RX ORDER — PROPOFOL 10 MG/ML
VIAL (ML) INTRAVENOUS CONTINUOUS PRN
Status: DISCONTINUED | OUTPATIENT
Start: 2024-12-13 | End: 2024-12-13

## 2024-12-13 RX ORDER — SODIUM CHLORIDE, SODIUM LACTATE, POTASSIUM CHLORIDE, CALCIUM CHLORIDE 600; 310; 30; 20 MG/100ML; MG/100ML; MG/100ML; MG/100ML
INJECTION, SOLUTION INTRAVENOUS CONTINUOUS
Status: DISCONTINUED | OUTPATIENT
Start: 2024-12-13 | End: 2024-12-13 | Stop reason: HOSPADM

## 2024-12-13 RX ADMIN — SODIUM CHLORIDE, POTASSIUM CHLORIDE, SODIUM LACTATE AND CALCIUM CHLORIDE: 600; 310; 30; 20 INJECTION, SOLUTION INTRAVENOUS at 07:12

## 2024-12-13 RX ADMIN — PROPOFOL 150 MCG/KG/MIN: 10 INJECTION, EMULSION INTRAVENOUS at 07:12

## 2024-12-13 RX ADMIN — FENTANYL CITRATE 100 MCG: 50 INJECTION, SOLUTION INTRAMUSCULAR; INTRAVENOUS at 07:12

## 2024-12-13 RX ADMIN — PROPOFOL 100 MG: 10 INJECTION, EMULSION INTRAVENOUS at 07:12

## 2024-12-13 RX ADMIN — LIDOCAINE HYDROCHLORIDE 50 MG: 20 INJECTION INTRAVENOUS at 07:12

## 2024-12-13 RX ADMIN — PROPOFOL 100 MG: 10 INJECTION, EMULSION INTRAVENOUS at 08:12

## 2024-12-13 RX ADMIN — GLYCOPYRROLATE 0.2 MG: 0.2 INJECTION, SOLUTION INTRAMUSCULAR; INTRAVENOUS at 07:12

## 2024-12-13 NOTE — TRANSFER OF CARE
Anesthesia Transfer of Care Note    Patient: Ayaka Browne    Procedure(s) Performed: Procedure(s) (LRB):  EGD (ESOPHAGOGASTRODUODENOSCOPY) (N/A)    Patient location: PACU    Anesthesia Type: general    Transport from OR: Transported from OR on room air with adequate spontaneous ventilation    Post pain: adequate analgesia    Post assessment: no apparent anesthetic complications and tolerated procedure well    Post vital signs: stable    Level of consciousness: awake    Nausea/Vomiting: no nausea/vomiting    Complications: none    Transfer of care protocol was followed      Last vitals: Visit Vitals  /81 (BP Location: Right arm, Patient Position: Lying)   Pulse 81   Temp 36.9 °C (98.4 °F) (Skin)   Resp 18   Ht 5' (1.524 m)   Wt 91.2 kg (201 lb)   LMP 12/09/2024 (Approximate)   SpO2 99%   Breastfeeding No   BMI 39.26 kg/m²

## 2024-12-13 NOTE — BRIEF OP NOTE
Discharge Note  Short Stay      SUMMARY     Admit Date: 12/13/2024    Attending Physician: Steve White Jr., MD     Discharge Physician: Steve White Jr., MD    Discharge Date: 12/13/2024 8:35 AM    Final Diagnosis: Belching [R14.2]  Abdominal bloating [R14.0]  Diarrhea, unspecified type [R19.7]  Mouth ulcers [K12.1]      Impression:            - Normal oropharynx.                          - Normal larynx.                          - Normal cricopharyngeus.                          - Normal upper third of esophagus and middle third                          of esophagus.                          - Reflux esophagitis. Biopsied.                          - Z-line regular, 36-37 cm from the incisors.                          - Patulous lower esophageal sphincter.                          - Normal cardia.                          - Normal gastric fundus and gastric body.                          - Normal antrum and prepyloric region of the                          stomach. Biopsied.                          - Normal pylorus.                          - Normal examined duodenum. Biopsied.                          - Normal major papilla.   Recommendation:        - Discharge patient to home.                          - Await pathology results.                          - Follow an antireflux regimen.                          - Exercise and weight loss.                          - Continue present medications.                          - Use Bentyl (dicyclomine) 10 mg PO QID 30 min AC.                          - Add Prilosec (omeprazole) 40 mg PO daily.                          - Call the G.I. clinic in 2 weeks for reports (if                          you haven't heard from us sooner) [phone].                          - Return to GI clinic in 6-8 weeks.   Steve White MD   12/13/2024       Disposition: HOME OR SELF CARE    Patient Instructions:   Current Discharge Medication List        START taking these  medications    Details   omeprazole (PRILOSEC) 40 MG capsule Take 1 capsule (40 mg total) by mouth before breakfast.  Qty: 90 capsule, Refills: 3           CONTINUE these medications which have NOT CHANGED    Details   busPIRone (BUSPAR) 10 MG tablet Take 1 tablet (10 mg total) by mouth 3 (three) times daily as needed (anxiety).  Qty: 90 tablet, Refills: 1    Associated Diagnoses: Anxiety      dicyclomine (BENTYL) 10 MG capsule Take 1 capsule (10 mg total) by mouth 4 (four) times daily before meals and nightly.  Qty: 120 capsule, Refills: 11      propranoloL (INDERAL) 10 MG tablet Take 1 tablet (10 mg total) by mouth daily as needed (anxiety).  Qty: 30 tablet, Refills: 1    Comments: .  Associated Diagnoses: Situational anxiety      tiZANidine (ZANAFLEX) 2 MG tablet Take 2 tablets (4 mg total) by mouth nightly as needed (headache).  Qty: 60 tablet, Refills: 3    Associated Diagnoses: Cervicogenic headache      (Magic mouthwash) 1:1:1 diphenhydrAMINE(Benadryl) 12.5mg/5ml liq, aluminum & magnesium hydroxide-simethicone (Maalox), LIDOcaine viscous 2% Swish and spit 5 mLs every 2 (two) hours as needed (oral pain). for mouth sores  Qty: 120 mL, Refills: 2    Associated Diagnoses: Mouth ulcers      albuterol (PROVENTIL/VENTOLIN HFA) 90 mcg/actuation inhaler Inhale 2 puffs into the lungs every 4 (four) hours as needed.      benzonatate (TESSALON) 200 MG capsule Take 1 capsule by mouth 3 times daily as needed.      buPROPion (WELLBUTRIN XL) 150 MG TB24 tablet Take 150 mg by mouth once daily.      levonorgestrel (MIRENA IU)       metFORMIN (GLUCOPHAGE) 500 MG tablet Take 1 tablet by mouth 2 (two) times daily.      naltrexone (DEPADE) 50 mg tablet Take 50 mg by mouth once daily.      ondansetron (ZOFRAN) 4 MG tablet Take 1 tablet (4 mg total) by mouth every 8 (eight) hours as needed for Nausea.  Qty: 30 tablet, Refills: 1      tirzepatide, weight loss, (ZEPBOUND) 5 mg/0.5 mL PnIj Inject 5 mg into the skin every 7 days.            STOP taking these medications       nitrofurantoin, macrocrystal-monohydrate, (MACROBID) 100 MG capsule Comments:   Reason for Stopping:         phenazopyridine (PYRIDIUM) 200 MG tablet Comments:   Reason for Stopping:         pindoloL (VISKEN) 5 MG Tab Comments:   Reason for Stopping:               Discharge Procedure Orders (must include Diet, Follow-up, Activity)    Follow Up:  Follow up with PCP as per your routine.  Please follow an anti reflux diet and a high fiber diet.  Activity as tolerated.    No driving day of procedure.

## 2024-12-13 NOTE — H&P
History & Physical - Short Stay  Gastroenterology      SUBJECTIVE:     Procedure: Gastroscopy    Chief Complaint/Indication for Procedure:   Abdominal pain, Heartburn and nausea.  Diarrhea.  Mouth Lesions     History of Present Illness:  See recent GI OV note:  Office Visit   10/11/2024  [facility]        Era Gibbs NP  Gastroenterology Diarrhea, unspecified type +9 more  Dx Abdominal Pain  Diarrhea  Mouth Lesions; Referred by Therese Tam MD  Reason for Visit      Progress Notes     Era Gibbs NP at 10/11/2024  9:30 AM     Status: Signed   Expand All Collapse All  Subjective:         Subjective  Patient ID: Ayaka Browne is a 24 y.o. female Body mass index is 37.54 kg/m².     Chief Complaint: Abdominal Pain, Diarrhea, and Mouth Lesions     This patient is new to me.  Referring Provider: Dr. Therese Tam for mouth ulcers, intermittent diarrhea, and intermittent abdominal pain.     Patient's  present and assisting with history.     GI Problem  The primary symptoms include weight loss (Intentional; patient currently on Zepbound), abdominal pain, nausea and diarrhea. Primary symptoms do not include fever, vomiting, melena, hematemesis, jaundice, hematochezia or dysuria.   The abdominal pain began more than 2 days ago. The abdominal pain has been gradually worsening (started in 2020) since its onset. The abdominal pain is located in the suprapubic region, LLQ and RLQ. The abdominal pain does not radiate. The severity of the abdominal pain is 3/10 (Constant bloating/gas pain that varies in severity; worsens after eating). The abdominal pain is relieved by bowel movements and being still (laying down; has tried Gas-X with mild improvement).   Nausea began more than 1 week ago (typically occurs around Zepbound injections; takes Zofran 4 mg p.r.n. once every 2 weeks with improvement). The nausea is associated with eating.   The diarrhea began more than 1 week ago  (predominately diarrhea recently). The diarrhea is semi-solid (rated stool 6 on [place] scale). 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   The illness is also significant for bloating (has tried gas-X p.r.n. with no improvement) and constipation (Intermittent bouts of constipation with last episode about a month ago; during episodes of constipation she can skip up to 7 days without a BM). The illness does not include dysphagia or odynophagia. Associated symptoms comments: Hx of recurrent mouth ulcers (usually present on lips, tongue, and gums) the last several years; flares occur every 1-2 months and then are present for about a month.  She reports having had multiple negative HSV 2 test but continues to have recurrent mouth ulcers.  Currently using magic mouthwash with short term relief; past treatments OTC pain relief medication and Tylenol. Past KEYONNA negative. Significant associated medical issues include GERD (Intermittent heartburn around times of Zepbound injections), liver disease (hx of fatty liver) and hemorrhoids. Associated medical issues do not include inflammatory bowel disease, gallstones, alcohol abuse, PUD, gastric bypass, bowel resection, irritable bowel syndrome or diverticulitis.      Review of Systems   Constitutional:  Positive for weight loss (Intentional; patient currently on Zepbound). Negative for fever.   HENT:  Negative for sore throat and trouble swallowing.    Respiratory:  Negative for cough, choking and shortness of breath.    Cardiovascular:  Negative for chest pain.   Gastrointestinal:  Positive for abdominal pain, bloating (has tried gas-X p.r.n. with no improvement), constipation (Intermittent bouts of constipation with last  episode about a month ago; during episodes of constipation she can skip up to 7 days without a BM), diarrhea and nausea. Negative for abdominal distention, anal bleeding, blood in stool, dysphagia, hematemesis, hematochezia, jaundice, melena, rectal pain and vomiting.         Wt Readings from Last 15 Encounters:   12/13/24 91.2 kg (201 lb)   11/19/24 91.3 kg (201 lb 4.5 oz)   11/14/24 90.7 kg (200 lb)   11/07/24 91 kg (200 lb 11.2 oz)   10/18/24 88.8 kg (195 lb 12.3 oz)   10/11/24 87.2 kg (192 lb 3.9 oz)   10/01/24 89.1 kg (196 lb 6.9 oz)   08/27/24 86.7 kg (191 lb 3.2 oz)   05/13/24 85.3 kg (188 lb 1.6 oz)   03/20/24 80.4 kg (177 lb 4.8 oz)   03/20/24 79.7 kg (175 lb 11.3 oz)   02/20/24 77.1 kg (170 lb)   01/26/24 76.7 kg (169 lb)   09/20/23 77.3 kg (170 lb 6.7 oz)   04/01/23 81.6 kg (180 lb)           Assessment:      Assessment  1. Diarrhea, unspecified type    2. History of constipation    3. Irregular bowel habits    4. Lower abdominal pain    5. Mouth sores    6. Heartburn    7. Nausea    8. Abdominal bloating    9. Fatty liver    10. Elevated AST (SGOT)          Plan:      Plan  Diarrhea, unspecified type, History of constipation, & Irregular bowel habits  - schedule Colonoscopy, discussed procedure with the patient, including risks and benefits, patient verbalized understanding  - Recommended increase fiber in diet, especially soluble fiber since this can help bulk up the stool consistency and may help to slow down how fast the stool goes through the colon and can prevent diarrhea  - avoid lactose, alcohol, & caffeine  - avoid known triggers  -     Giardia / Cryptosporidum, EIA; Future; Expected date: 10/11/2024  -     Stool Exam-Ova,Cysts,Parasites; Future; Expected date: 10/11/2024  -     Clostridium difficile EIA; Future; Expected date: 10/11/2024  -     Stool culture; Future; Expected date: 10/11/2024  - START: dicyclomine (BENTYL) 10 MG capsule; Take 1 capsule (10 mg total) by mouth 4 (four) times daily  before meals and nightly.  Dispense: 120 capsule; Refill: 0  -     H. pylori antigen, stool; Future; Expected date: 10/11/2024     Lower abdominal pain  - schedule Colonoscopy, discussed procedure with the patient, including risks and benefits, patient verbalized understanding  -     CT Abdomen Pelvis With IV Contrast Routine Oral Contrast; Future; Expected date: 10/11/2024  -START: dicyclomine (BENTYL) 10 MG capsule; Take 1 capsule (10 mg total) by mouth 4 (four) times daily before meals and nightly.  Dispense: 120 capsule; Refill: 0     Mouth sores  - schedule EGD, discussed procedure with patient, including risks and benefits, patient verbalized understanding  -     Ambulatory referral/consult to ENT; Future; Expected date: 10/18/2024     Heartburn  - schedule EGD, discussed procedure with patient, including risks and benefits, patient verbalized understanding  -Avoid large meals, avoid eating within 2-3 hours of bedtime (avoid late night eating & lying down soon after eating), elevate head of bed if nocturnal symptoms are present, smoking cessation (if current smoker), & weight loss (if overweight).   -Avoid known foods which trigger reflux symptoms & to minimize/avoid high-fat foods, chocolate, caffeine, citrus, alcohol, & tomato products.  -Avoid/limit use of NSAID's, since they can cause GI upset, bleeding, and/or ulcers. If needed, take with food.  - typically occurs after Zepbound injections; consider decreasing dose or alternative medication  - consider starting PPI  -     H. pylori antigen, stool; Future; Expected date: 10/11/2024     Nausea  - schedule EGD, discussed procedure with patient, including risks and benefits, patient verbalized understanding  -Avoid/limit use of NSAID's, since they can cause GI upset, bleeding, and/or ulcers. If needed, take with food.  - typically occurs after Zepbound injections; consider decreasing dose or alternative medication  - continue Zofran 4 mg p.r.n.  -     H.  pylori antigen, stool; Future; Expected date: 10/11/2024     Abdominal bloating  - schedule EGD, discussed procedure with patient, including risks and benefits, patient verbalized understanding  - schedule Colonoscopy, discussed procedure with the patient, including risks and benefits, patient verbalized understanding  - recommend OTC simethicone as directed, such as Phazyme or Gas-x  - recommend low gas diet: Reduce or eliminate these foods from your diet: Broccoli, Cauliflower, [place] sprouts, Cabbage, Cooked dried beans, Carbonated beverages (sparkling water, soda, beer, champagne)  Other Causes Of Excess Gas Include:   1) EATING TOO FAST or TALKING WHILE YOU CHEW may cause you to swallow air. This increases the amount of gas in the stomach and may worsen your symptoms.  --> Chew each mouthful completely before swallowing. Take your time.  2) OVEREATING may increase the feeling of being bloated and cause more gas.  --> When you are full, stop eating.  3) CONSTIPATION can increase the amount of normal intestinal gas.  --> Avoid constipation by increasing the amount of fiber in your diet by including whole cereal grains, fresh vegetables (except those in the above list) and fresh fruits. High-fiber foods absorb water and carry it out of the body. When increasing the amount of fiber in your diet, you also need to increase the amount of water that you drink. You should drink at least eight 8-ounce glasses of water (two quarts) per day.  -     H. pylori antigen, stool; Future; Expected date: 10/11/2024  - celiac testing in process     Fatty liver & Elevated AST (SGOT)  For fatty liver recommend: low fat, low cholesterol diet, maintain good control of blood sugars and cholesterol levels, exercise, weight loss (if overweight), minimize/avoid alcohol and tylenol products, & follow-up with PCP for continued evaluation and management; if specialist is needed, recommend seeing hepatology.  - repeat LFTs in about 4  weeks  -     Hepatic Function Panel; Future; Expected date: 11/11/2024               History of Present Illness:      Facility-Administered Medications Prior to Admission   Medication    hepatitis B (HEPLISAV-B) 20 mcg/0.5 mL vaccine 0.5 mL    measles, mumps and rubella vaccine 1,000-12,500 TCID50/0.5 mL injection 0.5 mL     PTA Medications   Medication Sig    busPIRone (BUSPAR) 10 MG tablet Take 1 tablet (10 mg total) by mouth 3 (three) times daily as needed (anxiety).    dicyclomine (BENTYL) 10 MG capsule Take 1 capsule (10 mg total) by mouth 4 (four) times daily before meals and nightly.    propranoloL (INDERAL) 10 MG tablet Take 1 tablet (10 mg total) by mouth daily as needed (anxiety).    tiZANidine (ZANAFLEX) 2 MG tablet Take 2 tablets (4 mg total) by mouth nightly as needed (headache).    (Magic mouthwash) 1:1:1 diphenhydrAMINE(Benadryl) 12.5mg/5ml liq, aluminum & magnesium hydroxide-simethicone (Maalox), LIDOcaine viscous 2% Swish and spit 5 mLs every 2 (two) hours as needed (oral pain). for mouth sores    albuterol (PROVENTIL/VENTOLIN HFA) 90 mcg/actuation inhaler Inhale 2 puffs into the lungs every 4 (four) hours as needed.    benzonatate (TESSALON) 200 MG capsule Take 1 capsule by mouth 3 times daily as needed.    buPROPion (WELLBUTRIN XL) 150 MG TB24 tablet Take 150 mg by mouth once daily.    levonorgestrel (MIRENA IU)     metFORMIN (GLUCOPHAGE) 500 MG tablet Take 1 tablet by mouth 2 (two) times daily.    naltrexone (DEPADE) 50 mg tablet Take 50 mg by mouth once daily.    nitrofurantoin, macrocrystal-monohydrate, (MACROBID) 100 MG capsule Take 1 capsule by mouth 2 (two) times daily.    ondansetron (ZOFRAN) 4 MG tablet Take 1 tablet (4 mg total) by mouth every 8 (eight) hours as needed for Nausea.    phenazopyridine (PYRIDIUM) 200 MG tablet Take 200 mg by mouth.    pindoloL (VISKEN) 5 MG Tab Take 5 mg by mouth.    tirzepatide, weight loss, (ZEPBOUND) 5 mg/0.5 mL PnRyne Inject 5 mg into the skin every 7  days.       Review of patient's allergies indicates:   Allergen Reactions    Azithromycin Rash    Minocycline Swelling     Other reaction(s): lips, eyes and hands swellig  Swelling of face and lips  Swelling of face and lips      Codeine     Rice Other (See Comments)     Congestion  Congestion          History reviewed. No pertinent past medical history.  Past Surgical History:   Procedure Laterality Date    ANTERIOR CRUCIATE LIGAMENT REPAIR      COLONOSCOPY N/A 11/14/2024    Procedure: COLONOSCOPY;  Surgeon: Steve White Jr., MD;  Location: [facility];  Service: Endoscopy;  Laterality: N/A;    TONSILLECTOMY  2006    TONSILLECTOMY AND ADENOIDECTOMY      WISDOM TOOTH EXTRACTION      WRIST SURGERY       Family History   Problem Relation Name Age of Onset    Ovarian cancer Paternal Grandmother julio     Cancer Paternal Grandmother julio     Colon cancer Maternal Grandmother      Early death Father yunier davis     Hyperlipidemia Mother bev davis     Breast cancer Neg Hx      Crohn's disease Neg Hx      Ulcerative colitis Neg Hx      Stomach cancer Neg Hx      Esophageal cancer Neg Hx      Uterine cancer Neg Hx      Cervical cancer Neg Hx       Social History     Tobacco Use    Smoking status: Never    Smokeless tobacco: Never   Substance Use Topics    Alcohol use: Yes     Alcohol/week: 1.0 standard drink of alcohol     Types: 1 Glasses of wine per week     Comment: Occ/socially    Drug use: Never         OBJECTIVE:     Vital Signs (Most Recent)  Temp: 98.4 °F (36.9 °C) (12/13/24 0720)  Pulse: 81 (12/13/24 0720)  Resp: 18 (12/13/24 0720)  BP: 137/81 (12/13/24 0720)  SpO2: 99 % (12/13/24 0720)    Physical Exam:  : Ht: 5' (152.4 cm)   Wt: 91.2 kg   BMI: 39.26 kg/m² .                                                       GENERAL:  Comfortable, in no acute distress.                                 HEENT EXAM:  Nonicteric.  No adenopathy.  Oropharynx is clear.               NECK:  Supple.                                                                LUNGS:  Clear.                                                               CARDIAC:  Regular rate and rhythm.  S1, S2.  No murmur.                      ABDOMEN:  Obese.  Soft, positive bowel sounds, nontender.  No hepatosplenomegaly or masses.  No rebound or guarding.                                             EXTREMITIES:  No edema.     MENTAL STATUS:  Alert and oriented.    ASSESSMENT/PLAN:     Assessment: Abdominal pain, Heartburn and nausea.  Diarrhea.  Mouth Lesions    Plan: Gastroscopy    Anesthesia Plan:   MAC / General Anaesthesia    ASA Grade: ASA 2 - Patient with mild systemic disease with no functional limitations    MALLAMPATI SCORE: II (hard and soft palate, upper portion of tonsils anduvula visible)

## 2024-12-13 NOTE — [employer] PATIENT INSTRUCTIONS
Discharge Summary/Instructions after an Endoscopic Procedure  Patient Name: Ayaka Browne  Patient MRN: [id number]  Patient YOB: 2000 Friday, December 13, 2024  Steve White MD  Dear patient,  As a result of recent federal legislation (The Federal Cures Act), you may   receive lab or pathology results from your procedure in your [facility]   account before your physician is able to contact you. Your physician or   their representative will relay the results to you with their   recommendations at their soonest availability.  Thank you,  RESTRICTIONS:  During your procedure today, you received medications for sedation.  These   medications may affect your judgment, balance and coordination.  Therefore,   for 24 hours, you have the following restrictions:   - DO NOT drive a car, operate machinery, make legal/financial decisions,   sign important papers or drink alcohol.    ACTIVITY:  Today: no heavy lifting, straining or running due to procedural   sedation/anesthesia.  The following day: return to full activity including work.  DIET:  Eat and drink normally unless instructed otherwise.     TREATMENT FOR COMMON SIDE EFFECTS:  - Mild abdominal pain, nausea, belching, bloating or excessive gas:  rest,   eat lightly and use a heating pad.  - Sore Throat: treat with throat lozenges and/or gargle with warm salt   water.  - Because air was used during the procedure, expelling large amounts of air   from your rectum or belching is normal.  - If a bowel prep was taken, you may not have a bowel movement for 1-3 days.    This is normal.  SYMPTOMS TO WATCH FOR AND REPORT TO YOUR PHYSICIAN:  1. Abdominal pain or bloating, other than gas cramps.  2. Chest pain.  3. Back pain.  4. Signs of infection such as: chills or fever occurring within 24 hours   after the procedure.  5. Rectal bleeding, which would show as bright red, maroon, or black stools.   (A tablespoon of blood from the rectum is not serious,  especially if   hemorrhoids are present.)  6. Vomiting.  7. Weakness or dizziness.  GO DIRECTLY TO THE NEAREST EMERGENCY ROOM IF YOU HAVE ANY OF THE FOLLOWING:      Difficulty breathing              Chills and/or fever over 101 F   Persistent vomiting and/or vomiting blood   Severe abdominal pain   Severe chest pain   Black, tarry stools   Bleeding- more than one tablespoon   Any other symptom or condition that you feel may need urgent attention  Your doctor recommends these additional instructions:  If any biopsies were taken, your doctors clinic will contact you in 1 to 2   weeks with any results.  Follow an antireflux regimen.  This includes:       - Do not lie down for at least 3 to 4 hours after meals.        - Raise the head of the bed 4 to 6 inches.        - Decrease excess weight.        - Avoid citrus juices and other acidic foods, alcohol, chocolate, mints,   coffee and other caffeinated beverages, carbonated beverages, fatty and   fried foods.        - Avoid tight-fitting clothing.        - Avoid cigarettes and other tobacco products.   Especially:   EXERCISE AND WEIGHT LOSS.  Continue your present medications.   Take Bentyl (dicyclomine) 10 mg by mouth four times per day 30 minutes   before meals.   Add Prilosec (omeprazole) 40 mg by mouth once a day, every morning before   breakfast.   Return to GI clinic in 6-8 weeks.  For questions, problems or results please call your physician - Steve White MD at Work:  [phone].  EMERGENCY PHONE NUMBER: [phone], LAB RESULTS: [phone]  IF A COMPLICATION OR EMERGENCY SITUATION ARISES AND YOU ARE UNABLE TO REACH   YOUR PHYSICIAN - GO DIRECTLY TO THE EMERGENCY ROOM.  ___________________________________________  Nurse Signature  ___________________________________________  Patient/Designated Responsible Party Signature  Steve White MD  12/13/2024 8:31:16 AM  This report has been verified and signed electronically.  Dear patient,  As  a result of recent federal legislation (The Federal Cures Act), you may   receive lab or pathology results from your procedure in your [facility]   account before your physician is able to contact you. Your physician or   their representative will relay the results to you with their   recommendations at their soonest availability.  Thank you.  PROVATION

## 2024-12-13 NOTE — ANESTHESIA PREPROCEDURE EVALUATION
12/13/2024  Ayaka Browne is a 24 y.o., female.      Pre-op Assessment    I have reviewed the NPO Status.   I have reviewed the Medications.     Review of Systems  Anesthesia Hx:  No problems with previous Anesthesia                Cardiovascular:  Exercise tolerance: good                                             Pulmonary:  Pulmonary Normal                       Hepatic/GI:  Bowel Prep.       Not Taking GLP-1 Agonists            Neurological:  Neurology Normal                                      Endocrine:        Morbid Obesity / BMI > 40      Physical Exam  General: Well nourished    Airway:  Mallampati: II   Mouth Opening: Normal  TM Distance: Normal  Tongue: Normal  Neck ROM: Normal ROM    Dental:  Intact    Chest/Lungs:  Clear to auscultation, Normal Respiratory Rate        Anesthesia Plan  Type of Anesthesia, risks & benefits discussed:    Anesthesia Type: Gen Natural Airway  Intra-op Monitoring Plan: Standard ASA Monitors  Induction:  IV  Informed Consent: Informed consent signed with the Patient and all parties understand the risks and agree with anesthesia plan.  All questions answered.   ASA Score: 2    Ready For Surgery From Anesthesia Perspective.     .

## 2024-12-13 NOTE — DISCHARGE INSTRUCTIONS
Recovery After Procedural Sedation (Adult)   You have been given medicine by vein to make you sleep during your surgery. This may have included both a pain medicine and sleeping medicine. Most of the effects have worn off. But you may still have some drowsiness for the next 6 to 8 hours.  Home care  Follow these guidelines when you get home:  For the next 8 hours, you should be watched by a responsible adult. This person should make sure your condition is not getting worse.  Don't drink any alcohol for the next 24 hours.  Don't drive, operate dangerous machinery, or make important business or personal decisions during the next 24 hours.  To prevent injury or falls, use caution when standing and walking for at least 24 hours after your procedure.  Note: Your healthcare provider may tell you not to take any medicine by mouth for pain or sleep in the next 4 hours. These medicines may react with the medicines you were given in the hospital. This could cause a much stronger response than usual.  Follow-up care  Follow up with your healthcare provider if you are not alert and back to your usual level of activity within 12 hours.  When to seek medical advice  Call your healthcare provider right away if any of these occur:  Drowsiness gets worse  Weakness or dizziness gets worse  Repeated vomiting  You can't be awakened  Fever  New rash  weendy last reviewed this educational content on 9/1/2019  © 2044-2511 [employer]. [address] [place] [address], [place] [address]. All rights reserved. This information is not intended as a substitute for professional medical care. Always follow your healthcare professional's instructions         Tips to Control Acid Reflux    To control acid reflux, youll need to make some basic diet and lifestyle changes. The simple steps outlined below may be all youll need to ease discomfort.  Watch what you eat  Avoid fatty foods and spicy foods.  Eat fewer acidic foods, such as citrus  and tomato-based foods. These can increase symptoms.  Limit drinking alcohol, caffeine, and fizzy beverages. All increase acid reflux.  Try limiting chocolate, peppermint, and spearmint. These can worsen acid reflux in some people.  Watch when you eat  Avoid lying down for 3 hours after eating.  Do not snack before going to bed.  Raise your head  Raising your head and upper body by 4 to 6 inches helps limit reflux when youre lying down. Put blocks under the head of your bed frame to raise it.  Other changes  Lose weight, if you need to  Dont exercise near bedtime  Avoid tight-fitting clothes  Limit aspirin and ibuprofen  Stop smoking   Date Last Reviewed: 7/1/2016  © 6180-0398 [employer]. [address] [place] [address], [place] [address]. All rights reserved. This information is not intended as a substitute for professional medical care. Always follow your healthcare professional's instructions.         High-Fiber Diet  Fiber is in fruits, vegetables, cereals, and grains. Fiber passes through your body undigested. A high-fiber diet helps food move through your intestinal tract. The added bulk is helpful in preventing constipation. In people with diverticulosis, fiber helps clean out the pouches along the colon wall. It also prevents new pouches from forming. A high-fiber diet reduces the risk of colon cancer. It also lowers blood cholesterol and prevents high blood sugar in people with diabetes.    The fiber-rich foods listed below should be part of your diet. If you are not used to high-fiber foods, start with 1 or 2 foods from this list. Every 3 to 4 days add a new one to your diet. Do this until you are eating 4 high-fiber foods per day. This should give you 20 to 35 grams of fiber a day. It is also important to drink a lot of water when you are on this diet. You should have 6 to 8 glasses of water a day. Water makes the fiber swell and increases the benefit.  Foods high in dietary fiber  The following  foods are high in dietary fiber:  Breads. Breads made with 100% whole-wheat flour; elisabeth, wheat, or rye crackers; whole-grain tortillas, bran muffins.  Cereals. Whole-grain and bran cereals with bran (shredded wheat, wheat flakes, raisin bran, corn bran); oatmeal, rolled oats, granola, and brown rice.  Fruits. Fresh fruits and their edible skins (pears, prunes, raisins, berries, apples, and apricots); bananas, citrus fruit, mangoes, pineapple; and prune juice.  Nuts. Any nuts and seeds.  Vegetables. Best served raw or lightly cooked. All types, especially: green peas, celery, eggplant, potatoes, spinach, broccoli, Fenton sprouts, winter squash, carrots, cauliflower, soybeans, lentils, and fresh and dried beans of all kinds.  Other. Popcorn, any spices.  Date Last Reviewed: 8/1/2016  © 5486-1014 [employer]. [address] [place] [address]. All rights reserved. This information is not intended as a substitute for professional medical care. Always follow your healthcare professional's instructions.

## 2024-12-16 VITALS
DIASTOLIC BLOOD PRESSURE: 83 MMHG | BODY MASS INDEX: 39.46 KG/M2 | HEIGHT: 60 IN | RESPIRATION RATE: 13 BRPM | WEIGHT: 201 LBS | OXYGEN SATURATION: 99 % | HEART RATE: 82 BPM | SYSTOLIC BLOOD PRESSURE: 112 MMHG | TEMPERATURE: 98 F

## 2024-12-17 NOTE — ANESTHESIA POSTPROCEDURE EVALUATION
Anesthesia Post Evaluation    Patient: Ayaka Browne    Procedure(s) Performed: Procedure(s) (LRB):  EGD (ESOPHAGOGASTRODUODENOSCOPY) (N/A)    Final Anesthesia Type: general      Patient location during evaluation: PACU  Patient participation: Yes- Able to Participate  Level of consciousness: awake and alert and oriented  Post-procedure vital signs: reviewed and stable  Pain management: adequate  Airway patency: patent    PONV status at discharge: No PONV  Anesthetic complications: no      Cardiovascular status: blood pressure returned to baseline  Respiratory status: unassisted, spontaneous ventilation and room air  Hydration status: euvolemic  Follow-up not needed.              Vitals Value Taken Time   /83 12/13/24 0829   Temp  12/13/24 0640   Pulse 82 12/13/24 0829   Resp 13 12/13/24 0829   SpO2 99 % 12/13/24 0829         Event Time   Out of Recovery 08:54:52         Pain/Yessy Score: No data recorded

## 2024-12-18 LAB
COMMENT: NORMAL
FINAL PATHOLOGIC DIAGNOSIS: NORMAL
GROSS: NORMAL
Lab: NORMAL
SUPPLEMENTAL DIAGNOSIS: NORMAL

## 2025-01-13 ENCOUNTER — OFFICE VISIT (OUTPATIENT)
Dept: GASTROENTEROLOGY | Facility: CLINIC | Age: 25
End: 2025-01-13
Payer: COMMERCIAL

## 2025-01-13 VITALS — WEIGHT: 203.25 LBS | HEIGHT: 60 IN | BODY MASS INDEX: 39.9 KG/M2

## 2025-01-13 DIAGNOSIS — Z98.890 HISTORY OF ESOPHAGOGASTRODUODENOSCOPY (EGD): ICD-10-CM

## 2025-01-13 DIAGNOSIS — Z87.19 HISTORY OF COLITIS: ICD-10-CM

## 2025-01-13 DIAGNOSIS — K52.9 CHRONIC DIARRHEA: ICD-10-CM

## 2025-01-13 DIAGNOSIS — K31.A0 INTESTINAL METAPLASIA OF STOMACH: ICD-10-CM

## 2025-01-13 DIAGNOSIS — Z98.890 HISTORY OF COLONOSCOPY: Primary | ICD-10-CM

## 2025-01-13 DIAGNOSIS — B37.81 ESOPHAGEAL CANDIDIASIS: ICD-10-CM

## 2025-01-13 DIAGNOSIS — R10.9 ABDOMINAL CRAMPING: ICD-10-CM

## 2025-01-13 DIAGNOSIS — K21.00 GASTROESOPHAGEAL REFLUX DISEASE WITH ESOPHAGITIS WITHOUT HEMORRHAGE: ICD-10-CM

## 2025-01-13 DIAGNOSIS — Z12.11 SCREENING FOR COLON CANCER: ICD-10-CM

## 2025-01-13 PROBLEM — K51.30 ULCERATIVE (CHRONIC) RECTOSIGMOIDITIS WITHOUT COMPLICATIONS: Status: ACTIVE | Noted: 2025-01-13

## 2025-01-13 PROCEDURE — 99213 OFFICE O/P EST LOW 20 MIN: CPT | Mod: S$GLB,,,

## 2025-01-13 PROCEDURE — 99999 PR PBB SHADOW E&M-EST. PATIENT-LVL III: CPT | Mod: PBBFAC,,,

## 2025-01-13 PROCEDURE — 1160F RVW MEDS BY RX/DR IN RCRD: CPT | Mod: CPTII,S$GLB,,

## 2025-01-13 PROCEDURE — 1159F MED LIST DOCD IN RCRD: CPT | Mod: CPTII,S$GLB,,

## 2025-01-13 PROCEDURE — 3008F BODY MASS INDEX DOCD: CPT | Mod: CPTII,S$GLB,,

## 2025-01-13 RX ORDER — FLUCONAZOLE 200 MG/1
TABLET ORAL
Qty: 16 TABLET | Refills: 0 | Status: SHIPPED | OUTPATIENT
Start: 2025-01-13 | End: 2025-01-28

## 2025-01-13 NOTE — PROGRESS NOTES
Subjective:       Patient ID: Ayaka Browne is a 24 y.o. female Body mass index is 39.7 kg/m².    Chief Complaint: Follow-up    Established patient of Dr. White and myself.  GI Problem  The primary symptoms include weight loss (Intentional; patient currently on Zepbound), abdominal pain, nausea and diarrhea. Primary symptoms do not include fever, fatigue, vomiting, melena, hematemesis, jaundice, hematochezia or dysuria.   The abdominal pain began more than 2 days ago (started in 2020). The abdominal pain has been gradually worsening (Waxes and wans) since its onset. The abdominal pain is located in the suprapubic region, LLQ and RLQ. The abdominal pain does not radiate. The severity of the abdominal pain is 0/10 (Denies pain currently; intermittent cramp/that typically occurs prior to BMs or spontaneous). The abdominal pain is relieved by bowel movements (Laying down, taking Bentyl, Gas-X, or Imodium p.r.n.).   Onset: typically occurs around Zepbound injections; takes Zofran 4 mg p.r.n. once every 2 weeks with improvement.   The diarrhea began more than 1 week ago. The diarrhea is semi-solid (rated stool 6 on Augusta scale). Daily occurrences: Typically has 2-3 BMs daily; takes Imodium p.r.n. with short term relief. Risk factors: denies recent antibiotics, suspect food, recent foreign travel, or new medications.   The illness is also significant for bloating (has tried gas-X p.r.n. with no improvement). The illness does not include chills, dysphagia, odynophagia or constipation. Associated symptoms comments: Hx of recurrent mouth ulcers (usually present on lips, tongue, and gums) the last several years; flares occur every 1-2 months and then are present for about a month.  She reports having had multiple negative HSV 2 test but continues to have recurrent mouth ulcers.  Currently using magic mouthwash with short term relief; past treatments OTC pain relief medication and Tylenol. Past KEYONNA negative. Significant  associated medical issues include GERD (Controlled taking Prilsoec 40 mg p.r.n.; heartburn around times of Zepbound injections), liver disease (hx of fatty liver) and hemorrhoids. Associated medical issues do not include gallstones, alcohol abuse, PUD, gastric bypass, bowel resection, irritable bowel syndrome or diverticulitis.     Review of Systems   Constitutional:  Positive for weight loss (Intentional; patient currently on Zepbound). Negative for activity change, appetite change, chills, diaphoresis, fatigue, fever and unexpected weight change.   HENT:  Negative for sore throat and trouble swallowing.    Respiratory:  Negative for cough, choking and shortness of breath.    Cardiovascular:  Negative for chest pain.   Gastrointestinal:  Positive for abdominal pain, bloating (has tried gas-X p.r.n. with no improvement), diarrhea and nausea. Negative for abdominal distention, anal bleeding, blood in stool, constipation, dysphagia, hematemesis, hematochezia, jaundice, melena, rectal pain and vomiting.   Genitourinary:  Negative for dysuria.       Patient's last menstrual period was 01/13/2025.  Past Medical History:   Diagnosis Date    GERD (gastroesophageal reflux disease)      Past Surgical History:   Procedure Laterality Date    ANTERIOR CRUCIATE LIGAMENT REPAIR      COLONOSCOPY N/A 11/14/2024    Procedure: COLONOSCOPY;  Surgeon: Steve White Jr., MD;  Location: [facility];  Service: Endoscopy;  Laterality: N/A;    ESOPHAGOGASTRODUODENOSCOPY N/A 12/13/2024    Procedure: EGD (ESOPHAGOGASTRODUODENOSCOPY);  Surgeon: Steve White Jr., MD;  Location: [facility];  Service: Endoscopy;  Laterality: N/A;    TONSILLECTOMY  2006    TONSILLECTOMY AND ADENOIDECTOMY      UPPER GASTROINTESTINAL ENDOSCOPY      WISDOM TOOTH EXTRACTION      WRIST SURGERY       Family History   Problem Relation Name Age of Onset    Ovarian cancer Paternal Grandmother julio     Cancer Paternal Grandmother julio     Colon cancer Maternal  Grandmother      Early death Father yunier davis     Hyperlipidemia Mother bev davis     Breast cancer Neg Hx      Crohn's disease Neg Hx      Ulcerative colitis Neg Hx      Stomach cancer Neg Hx      Esophageal cancer Neg Hx      Uterine cancer Neg Hx      Cervical cancer Neg Hx       Social History     Tobacco Use    Smoking status: Never    Smokeless tobacco: Never   Substance Use Topics    Alcohol use: Yes     Alcohol/week: 1.0 standard drink of alcohol     Types: 1 Glasses of wine per week     Comment: Occ/socially    Drug use: Never     Wt Readings from Last 10 Encounters:   01/13/25 92.2 kg (203 lb 4.2 oz)   01/03/25 90.4 kg (199 lb 3.2 oz)   12/13/24 91.2 kg (201 lb)   11/19/24 91.3 kg (201 lb 4.5 oz)   11/14/24 90.7 kg (200 lb)   11/07/24 91 kg (200 lb 11.2 oz)   10/18/24 88.8 kg (195 lb 12.3 oz)   10/11/24 87.2 kg (192 lb 3.9 oz)   10/01/24 89.1 kg (196 lb 6.9 oz)   08/27/24 86.7 kg (191 lb 3.2 oz)     Lab Results   Component Value Date    WBC 8.74 10/10/2024    HGB 13.2 10/10/2024    HCT 38.8 10/10/2024    MCV 87 10/10/2024     10/10/2024     CMP  Sodium   Date Value Ref Range Status   10/10/2024 137 136 - 145 mmol/L Final     Potassium   Date Value Ref Range Status   10/10/2024 4.5 3.5 - 5.1 mmol/L Final     Comment:     Anion Gap reference range revised on 4/28/2023  Specimen slightly hemolyzed       Chloride   Date Value Ref Range Status   10/10/2024 105 95 - 110 mmol/L Final     CO2   Date Value Ref Range Status   10/10/2024 23 22 - 31 mmol/L Final     Glucose   Date Value Ref Range Status   10/10/2024 83 70 - 110 mg/dL Final     Comment:     The ADA recommends the following guidelines for fasting glucose:    Normal:       less than 100 mg/dL    Prediabetes:  100 mg/dL to 125 mg/dL    Diabetes:     126 mg/dL or higher       BUN   Date Value Ref Range Status   10/10/2024 12 7 - 18 mg/dL Final     Creatinine   Date Value Ref Range Status   10/10/2024 0.53 0.50 - 1.40 mg/dL Final     Calcium    Date Value Ref Range Status   10/10/2024 9.1 8.4 - 10.2 mg/dL Final     Total Protein   Date Value Ref Range Status   10/24/2024 6.7 6.0 - 8.4 g/dL Final     Albumin   Date Value Ref Range Status   10/24/2024 3.8 3.5 - 5.2 g/dL Final     Total Bilirubin   Date Value Ref Range Status   10/24/2024 0.4 0.1 - 1.0 mg/dL Final     Comment:     For infants and newborns, interpretation of results should be based  on gestational age, weight and in agreement with clinical  observations.    Premature Infant recommended reference ranges:  Up to 24 hours.............<8.0 mg/dL  Up to 48 hours............<12.0 mg/dL  3-5 days..................<15.0 mg/dL  6-29 days.................<15.0 mg/dL       Alkaline Phosphatase   Date Value Ref Range Status   10/24/2024 71 40 - 150 U/L Final     AST   Date Value Ref Range Status   10/24/2024 24 10 - 40 U/L Final     ALT   Date Value Ref Range Status   10/24/2024 31 10 - 44 U/L Final     Anion Gap   Date Value Ref Range Status   10/10/2024 9 5 - 12 mmol/L Final     Comment:     Anion Gap reference range revised on 4/28/2023     Lab Results   Component Value Date    TSH 2.110 10/10/2024     Reviewed prior medical records including office visit with Dr. aTm 10/10/24, radiology report of CT abdomen and pelvis 10/31/2024, transvaginal ultrasound 01/03/2023, HIDA scan 01/05/2021, abdominal ultrasound 01/05/2021, CT abdomen and pelvis 05/22/2019 & endoscopy history (see surgical history).    Objective:      Physical Exam  Vitals and nursing note reviewed.   Constitutional:       General: She is not in acute distress.     Appearance: Normal appearance. She is obese. She is not ill-appearing.   HENT:      Mouth/Throat:      Lips: Pink. No lesions.   Cardiovascular:      Heart sounds: Normal heart sounds.   Pulmonary:      Effort: Pulmonary effort is normal. No respiratory distress.      Breath sounds: Normal breath sounds.   Abdominal:      General: Bowel sounds are normal. There is no  distension or abdominal bruit. There are no signs of injury.      Palpations: Abdomen is soft. There is no shifting dullness, fluid wave, hepatomegaly, splenomegaly or mass.      Tenderness: There is no abdominal tenderness. There is no guarding or rebound. Negative signs include Avitia's sign, Rovsing's sign and McBurney's sign.   Skin:     General: Skin is warm and dry.      Coloration: Skin is not jaundiced or pale.   Neurological:      Mental Status: She is alert and oriented to person, place, and time.   Psychiatric:         Attention and Perception: Attention normal.         Mood and Affect: Mood normal.         Speech: Speech normal.         Behavior: Behavior normal.         Assessment:       1. History of colonoscopy    2. History of esophagogastroduodenoscopy (EGD)    3. Esophageal candidiasis    4. Intestinal metaplasia of stomach    5. Gastroesophageal reflux disease with esophagitis without hemorrhage    6. Chronic diarrhea    7. Abdominal cramping    8. History of colitis    9. Screening for colon cancer          Plan:       History of colonoscopy & History of esophagogastroduodenoscopy (EGD)    - Informed patient of EGD and colonoscopy results, patient verbalized understanding    Esophageal candidiasis  -START: fluconazole (DIFLUCAN) 200 MG Tab; Take 2 tablets (400 mg total) by mouth once daily for 1 day, THEN 1 tablet (200 mg total) once daily for 14 days.  Dispense: 16 tablet; Refill: 0    Intestinal metaplasia of stomach  - discussed diagnosis with patient: It is a benign finding but is a risk factor for cancer.  As the incidence of cancer with this is still extremely low, recommend repeat EGD in 1-2 years (from previous EGD) to monitor finding, patient verbalized understanding    Gastroesophageal reflux disease with esophagitis without hemorrhage  -Avoid large meals, avoid eating within 2-3 hours of bedtime (avoid late night eating & lying down soon after eating), elevate head of bed if nocturnal  symptoms are present, smoking cessation (if current smoker), & weight loss (if overweight).   -Avoid known foods which trigger reflux symptoms & to minimize/avoid high-fat foods, chocolate, caffeine, citrus, alcohol, & tomato products.  -Avoid/limit use of NSAID's, since they can cause GI upset, bleeding, and/or ulcers. If needed, take with food.  - continue omeprazole 40 mg once daily p.r.n.    Chronic diarrhea & History of colitis  - recommend OTC probiotic, such as Florastor or Culturelle, taken as directed on packaging  - avoid lactose, alcohol, & caffeine  - avoid known triggers  - Recommended increase fiber in diet, especially soluble fiber since this can help bulk up the stool consistency and may help to slow down how fast the stool goes through the colon and can prevent diarrhea  - START Bentyl 10 mg 4 times daily as prescribed instead of p.r.n.  - consider using Colazal (balsalazide) 3 capsules PO BID.     Screening for colon cancer   - follow-up for surveillance colonoscopy 11/2027    Follow up in about 6 months (around 7/13/2025), or if symptoms worsen or fail to improve.      If no improvement in symptoms or symptoms worsen, call/follow-up at clinic or go to ER.        Total time spent on the encounter includes face to face time and non-face to face time preparing to see the patient (eg, review of tests), Obtaining and/or reviewing separately obtained history, Documenting clinical information in the electronic or other health record, Independently interpreting results (not separately reported) and communicating results to the patient/family/caregiver, or Care coordination (not separately reported).     A dictation software program was used for this note. Please expect some simple typographical  errors in this note.

## 2025-01-23 ENCOUNTER — TELEPHONE (OUTPATIENT)
Dept: RHEUMATOLOGY | Facility: CLINIC | Age: 25
End: 2025-01-23
Payer: COMMERCIAL

## 2025-02-04 NOTE — PROGRESS NOTES
Subjective:      Patient ID: Ayaka Browne is a 24 y.o. female.    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, but no uveitis, retinal vasculitis, characteristic skin lesions, pathergy  - Family History: Psoriasis: mother   - Social History: Never smoker, rare alcohol intake   - Gyn History: ; no VTE  - Occupation: Nurse L&D, starting [employer]  - Positive serologies: -   - Negative serologies: KEYONNA, RF, SSA, SSB, gliadin, TTG, RF, CCP, Adamaris-1  - Pathology:               - Oral cavity (ulcerated squamous mucosa with marked acute and chronic inflammation, HSV1 negative, negative for malignancy               - Terminal ileum, cecum, right colon, left colon, sigmoid rectum (11/15/2024) No enterocolitis identified  - Infectious screening labs:  - Imaging:              - CT abdomen (10/31/24) Fatty infiltration of the liver parenchyma without focal defect.   - Procedures   - Colonoscopy (2024) scattered mild inflammation in the rectum, in the recto-sigmoid colon and in the sigmoid colon secondary to proctosigmoid ulcerative colitis.  - EGD (24) normal oropharynx, larynx, cricopharyngeus, upper third and middle third of the esophagus, reflux esophagitis  - Previous Treatments:              - Magic mouthwash  - Current Treatments:   - Family History: no upcoming plans for pregnancy, not currently on birth control     Interval History:   This is a 24 year old woman with past medical history of right ACL repair, esophageal candidiasis, GERD, left wrist surgery s/p fracture, fatty liver, and obesity on tirzepatide who was referred to rheumatology by ENT for recurrent oral ulcers and genital ulcers. Per ENT note, she has had recurrent oral ulcers for at least 12 years (since Middle School) on the gums, tongue, lips, buccal mucosa but not the palate. No one else in the family has recurrent oral ulcers.  She  has had multiple negative HSV 1/2. Celiac has been ruled out. Colonoscopy (11/14/2024) showed scattered mild inflammation in the rectum, in the recto-sigmoid colon and in the sigmoid colon secondary to proctosigmoid ulcerative colitis, but pathology showed no significant enterocolitis. She does occasionally get genital ulcers but was herpes negative. She was given a prednisone mouthwash which has helped significantly. Her ulcers. She does occasionally get fevers (99.3), diarrhea (once a day), and abdominal cramping.     The patient denies patchy alopecia, nasal ulcers, sicca symptoms, rashes, photosensitivity, joint pain/swelling, pleuritic chest pain, shortness of breath, cough, bloody stool, weakness, numbness/tingling, VTE, uveitis, seizures, pathergy, and Raynaud's.      Objective:   /86 (BP Location: Left arm, Patient Position: Sitting)   Pulse 105   LMP 01/13/2025   Physical Exam   Constitutional: normal appearance.   HENT:   Head: Normocephalic and atraumatic.   Mouth/Throat: Mucous membranes are moist.   Mouth/Throat: 1 healing oral ulcer  Cardiovascular: Normal rate, regular rhythm and normal heart sounds.   Pulmonary/Chest: Effort normal and breath sounds normal.   Musculoskeletal:      Comments: No synovitis, dactylitis, enthesitis, effusions     Neurological: She is alert.   Skin: Skin is warm and dry. No rash noted.   No skin thickening, telangiectasias, calcinosis, psoriasiform lesions, lupoid lesions         Right Side Rheumatological Exam     Muscle Strength (0-5 scale):  Deltoid:  5  Biceps: 5/5   Triceps:  5  : 5/5   Iliopsoas: 5  Quadriceps:  5   Distal Lower Extremity: 5    Left Side Rheumatological Exam     Muscle Strength (0-5 scale):  Deltoid:  5  Biceps: 5/5   Triceps:  5  :  5/5   Iliopsoas: 5  Quadriceps:  5   Distal Lower Extremity: 5        No data to display    Labs (10/10/24)   CBC WBC, HGB, PLT WNL   ESR WNL   CRP 1.10   CMP (10/24/24) AST 40, ALT WNL    Assessment:      1. Genital ulcer, female    2. Mouth sores    3. Diarrhea, unspecified type      This is a 24 year old woman with past medical history of right ACL repair, esophageal candidiasis, GERD, left wrist surgery s/p fracture, fatty liver, and obesity on tirzepatide who was referred to rheumatology by ENT for recurrent oral ulcers and genital ulcers since middle school. No one else in the family has recurrent oral ulcers.  She has had multiple negative HSV 1/2. Celiac has been ruled out. Colonoscopy (11/14/2024) showed scattered mild inflammation in the rectum, in the recto-sigmoid colon and in the sigmoid colon secondary to proctosigmoid ulcerative colitis, but pathology showed no significant enterocolitis. She was given a prednisone mouthwash which has helped significantly, and now only gets 1-2 oral ulcers every 1-2 months, and genital ulcers even more rarely. She does occasionally get fevers (99.3), diarrhea (once a day), and abdominal cramping. She denies a history of characteristic skin lesions, pathergy, and uveitis.     Suspect probable Behcet's and discussed starting colchicine, but she gets ulcers infrequently and prefers to use prednisone mouthwash alone for now. I will reach out to her gastroenterologist as the scope indicated that she had scattered mild inflammation in the rectum, in the recto-sigmoid colon and in the sigmoid colon secondary to proctosigmoid ulcerative colitis, but pathology was negative.     Plan:     Problem List Items Addressed This Visit    None  Visit Diagnoses       Genital ulcer, female    -  Primary    Relevant Orders    HLA B27 Antigen    Misc Sendout Test, Blood HLA B51, HLA B5,    C-Reactive Protein    CBC Auto Differential    Creatinine, Serum    ALT (SGPT)    AST (SGOT)    Sedimentation rate    FERRITIN    Mouth sores        Relevant Orders    HLA B27 Antigen    Misc Sendout Test, Blood HLA B51, HLA B5,    C-Reactive Protein    CBC Auto Differential    Creatinine, Serum    ALT  (SGPT)    AST (SGOT)    Sedimentation rate    FERRITIN    Diarrhea, unspecified type              - Contact GI (Dr. Steve White) regarding scope findings  - Prednisone mouthwash PRN for now  - Consider Colchicine 0.6mg BID in future, she prefers to use prednisone mouthwash alone for now  - HLA B5, HLA B51, HLA B27, ESR, CRP, CBC, CMP, ferritin    Follow up in 3 months    60 minutes of total time spent on the encounter, which includes face to face time and non-face to face time preparing to see the patient (eg, review of tests), Obtaining and/or reviewing separately obtained history, Documenting clinical information in the electronic or other health record, Independently interpreting results (not separately reported) and communicating results to the patient/family/caregiver, or Care coordination (not separately reported).     This note was prepared with [employer] Direct voice recognition transcription software. Garbled syntax, mangled pronouns, and other bizarre constructions may be attributed to that software system       Carin Brothers M.D.  Rheumatology Dept  [place], [place]

## 2025-02-05 ENCOUNTER — OFFICE VISIT (OUTPATIENT)
Dept: RHEUMATOLOGY | Facility: CLINIC | Age: 25
End: 2025-02-05
Payer: COMMERCIAL

## 2025-02-05 VITALS — HEART RATE: 105 BPM | DIASTOLIC BLOOD PRESSURE: 86 MMHG | SYSTOLIC BLOOD PRESSURE: 126 MMHG

## 2025-02-05 DIAGNOSIS — N76.6 GENITAL ULCER, FEMALE: Primary | ICD-10-CM

## 2025-02-05 DIAGNOSIS — R19.7 DIARRHEA, UNSPECIFIED TYPE: ICD-10-CM

## 2025-02-05 DIAGNOSIS — K13.79 MOUTH SORES: ICD-10-CM

## 2025-02-05 PROCEDURE — 99999 PR PBB SHADOW E&M-EST. PATIENT-LVL III: CPT | Mod: PBBFAC,,, | Performed by: STUDENT IN AN ORGANIZED HEALTH CARE EDUCATION/TRAINING PROGRAM

## 2025-02-05 PROCEDURE — 99205 OFFICE O/P NEW HI 60 MIN: CPT | Mod: S$GLB,,, | Performed by: STUDENT IN AN ORGANIZED HEALTH CARE EDUCATION/TRAINING PROGRAM

## 2025-02-05 PROCEDURE — 1159F MED LIST DOCD IN RCRD: CPT | Mod: CPTII,S$GLB,, | Performed by: STUDENT IN AN ORGANIZED HEALTH CARE EDUCATION/TRAINING PROGRAM

## 2025-02-05 PROCEDURE — 3074F SYST BP LT 130 MM HG: CPT | Mod: CPTII,S$GLB,, | Performed by: STUDENT IN AN ORGANIZED HEALTH CARE EDUCATION/TRAINING PROGRAM

## 2025-02-05 PROCEDURE — 1160F RVW MEDS BY RX/DR IN RCRD: CPT | Mod: CPTII,S$GLB,, | Performed by: STUDENT IN AN ORGANIZED HEALTH CARE EDUCATION/TRAINING PROGRAM

## 2025-02-05 PROCEDURE — 3079F DIAST BP 80-89 MM HG: CPT | Mod: CPTII,S$GLB,, | Performed by: STUDENT IN AN ORGANIZED HEALTH CARE EDUCATION/TRAINING PROGRAM

## 2025-02-05 ASSESSMENT — ROUTINE ASSESSMENT OF PATIENT INDEX DATA (RAPID3)
MDHAQ FUNCTION SCORE: 0
PSYCHOLOGICAL DISTRESS SCORE: 1.1
PATIENT GLOBAL ASSESSMENT SCORE: 2
TOTAL RAPID3 SCORE: 1.67
PAIN SCORE: 3
FATIGUE SCORE: 0

## 2025-02-10 ENCOUNTER — CLINICAL SUPPORT (OUTPATIENT)
Dept: OBSTETRICS AND GYNECOLOGY | Facility: CLINIC | Age: 25
End: 2025-02-10
Payer: COMMERCIAL

## 2025-02-10 DIAGNOSIS — R39.9 UTI SYMPTOMS: Primary | ICD-10-CM

## 2025-02-10 LAB
BILIRUBIN, UA POC OHS: NEGATIVE
BLOOD, UA POC OHS: ABNORMAL
CLARITY, UA POC OHS: CLEAR
COLOR, UA POC OHS: YELLOW
GLUCOSE, UA POC OHS: NEGATIVE
KETONES, UA POC OHS: NEGATIVE
LEUKOCYTES, UA POC OHS: NEGATIVE
NITRITE, UA POC OHS: NEGATIVE
PH, UA POC OHS: 6
PROTEIN, UA POC OHS: NEGATIVE
SPECIFIC GRAVITY, UA POC OHS: >=1.03
UROBILINOGEN, UA POC OHS: 0.2

## 2025-02-10 PROCEDURE — 87086 URINE CULTURE/COLONY COUNT: CPT

## 2025-02-10 PROCEDURE — 81003 URINALYSIS AUTO W/O SCOPE: CPT | Mod: QW,S$GLB,,

## 2025-02-10 NOTE — PROGRESS NOTES
Pt presented to clinic to drop off urine sample for testing for uti symptoms  Informed pt will contact with results  Pt verb understanding

## 2025-02-12 LAB
BACTERIA UR CULT: NORMAL
BACTERIA UR CULT: NORMAL

## 2025-02-24 ENCOUNTER — RESULTS FOLLOW-UP (OUTPATIENT)
Dept: GASTROENTEROLOGY | Facility: HOSPITAL | Age: 25
End: 2025-02-24

## 2025-03-17 ENCOUNTER — PATIENT MESSAGE (OUTPATIENT)
Dept: OBSTETRICS AND GYNECOLOGY | Facility: CLINIC | Age: 25
End: 2025-03-17
Payer: COMMERCIAL

## 2025-03-19 ENCOUNTER — OFFICE VISIT (OUTPATIENT)
Dept: OBSTETRICS AND GYNECOLOGY | Facility: CLINIC | Age: 25
End: 2025-03-19
Payer: COMMERCIAL

## 2025-03-19 ENCOUNTER — RESULTS FOLLOW-UP (OUTPATIENT)
Dept: OBSTETRICS AND GYNECOLOGY | Facility: CLINIC | Age: 25
End: 2025-03-19

## 2025-03-19 VITALS
DIASTOLIC BLOOD PRESSURE: 88 MMHG | BODY MASS INDEX: 37.31 KG/M2 | WEIGHT: 190.06 LBS | HEIGHT: 60 IN | SYSTOLIC BLOOD PRESSURE: 124 MMHG

## 2025-03-19 DIAGNOSIS — N76.0 ACUTE VAGINITIS: ICD-10-CM

## 2025-03-19 DIAGNOSIS — N89.8 VAGINAL IRRITATION: Primary | ICD-10-CM

## 2025-03-19 LAB
BILIRUBIN, UA POC OHS: NEGATIVE
BLOOD, UA POC OHS: ABNORMAL
CLARITY, UA POC OHS: CLEAR
COLOR, UA POC OHS: YELLOW
GLUCOSE, UA POC OHS: NEGATIVE
KETONES, UA POC OHS: ABNORMAL
LEUKOCYTES, UA POC OHS: ABNORMAL
NITRITE, UA POC OHS: NEGATIVE
PH, UA POC OHS: 6
PROTEIN, UA POC OHS: 30
SPECIFIC GRAVITY, UA POC OHS: >=1.03
UROBILINOGEN, UA POC OHS: 0.2

## 2025-03-19 PROCEDURE — 99213 OFFICE O/P EST LOW 20 MIN: CPT | Mod: S$GLB,,,

## 2025-03-19 PROCEDURE — 3008F BODY MASS INDEX DOCD: CPT | Mod: CPTII,S$GLB,,

## 2025-03-19 PROCEDURE — 1159F MED LIST DOCD IN RCRD: CPT | Mod: CPTII,S$GLB,,

## 2025-03-19 PROCEDURE — 87591 N.GONORRHOEAE DNA AMP PROB: CPT

## 2025-03-19 PROCEDURE — 3079F DIAST BP 80-89 MM HG: CPT | Mod: CPTII,S$GLB,,

## 2025-03-19 PROCEDURE — 3074F SYST BP LT 130 MM HG: CPT | Mod: CPTII,S$GLB,,

## 2025-03-19 PROCEDURE — 87086 URINE CULTURE/COLONY COUNT: CPT

## 2025-03-19 PROCEDURE — 99999 PR PBB SHADOW E&M-EST. PATIENT-LVL IV: CPT | Mod: PBBFAC,,,

## 2025-03-19 PROCEDURE — 81003 URINALYSIS AUTO W/O SCOPE: CPT | Mod: QW,S$GLB,,

## 2025-03-19 PROCEDURE — 81515 NFCT DS BV&VAGINITIS DNA ALG: CPT

## 2025-03-19 NOTE — PROGRESS NOTES
"CC: Vaginitis  HPI: Patient presents for evaluation of an abnormal vaginal discharge. Symptoms have been present for  about a week  . Vaginal symptoms:  discharge described as yellow and scant brown with a "chemical" smell, slight irritation. Received positve home UP two days ago. Scheduled with OB navigator tomorrow. . Contraception: none. She denies blisters, bumps, urinary symptoms of dysuria, and warts. Sexually transmitted infection risk: very low risk of STD exposure. SA male partner, does not use condoms. This is the extent of the patient's complaints at this time.     ROS:  GENERAL: No fever, chills, fatigability or weight loss.  VULVAR: No pain, no lesions and no itching.  VAGINAL: No relaxation, no itching, + discharge, no abnormal bleeding and no lesions.  URINARY: No incontinence, no nocturia, no frequency and no dysuria.     PHYSICAL EXAM:  Physical Exam:   Constitutional: She is oriented to person, place, and time. She appears well-developed and well-nourished. No distress.    HENT:   Head: Normocephalic.    Eyes: Pupils are equal, round, and reactive to light.      Pulmonary/Chest: Effort normal. No respiratory distress.        Abdominal: Soft. She exhibits no distension. There is no abdominal tenderness.     Genitourinary:    Inguinal canal, uterus, right adnexa and left adnexa normal.      Pelvic exam was performed with patient supine.   The external female genitalia was normal.     Labial bartholins normal.Cervix is normal. Vagina exhibits no lesion. There is vaginal discharge (small amount, pale yellow) in the vagina. No erythema, tenderness or bleeding in the vagina.    No signs of injury in the vagina.             Musculoskeletal: Normal range of motion and moves all extremeties.       Neurological: She is alert and oriented to person, place, and time.    Skin: Skin is warm and dry.    Psychiatric: She has a normal mood and affect. Judgment and thought content normal.       Past Medical History: "   Diagnosis Date    GERD (gastroesophageal reflux disease)        Past Surgical History:   Procedure Laterality Date    ANTERIOR CRUCIATE LIGAMENT REPAIR      COLONOSCOPY N/A 11/14/2024    Procedure: COLONOSCOPY;  Surgeon: Steve White Jr., MD;  Location: [facility] ENDO;  Service: Endoscopy;  Laterality: N/A;    ESOPHAGOGASTRODUODENOSCOPY N/A 12/13/2024    Procedure: EGD (ESOPHAGOGASTRODUODENOSCOPY);  Surgeon: Steve White Jr., MD;  Location: [facility] ENDO;  Service: Endoscopy;  Laterality: N/A;    TONSILLECTOMY  2006    TONSILLECTOMY AND ADENOIDECTOMY      UPPER GASTROINTESTINAL ENDOSCOPY      WISDOM TOOTH EXTRACTION      WRIST SURGERY         Family History   Problem Relation Name Age of Onset    Ovarian cancer Paternal Grandmother julio     Cancer Paternal Grandmother julio     Colon cancer Maternal Grandmother      Early death Father yunier davis     Hyperlipidemia Mother bev davis     Breast cancer Neg Hx      Crohn's disease Neg Hx      Ulcerative colitis Neg Hx      Stomach cancer Neg Hx      Esophageal cancer Neg Hx      Uterine cancer Neg Hx      Cervical cancer Neg Hx         Social History     Socioeconomic History    Marital status:    Tobacco Use    Smoking status: Never    Smokeless tobacco: Never   Substance and Sexual Activity    Alcohol use: Yes     Alcohol/week: 1.0 standard drink of alcohol     Types: 1 Glasses of wine per week     Comment: Occ/socially    Drug use: Never    Sexual activity: Not Currently     Partners: Male     Birth control/protection: I.U.D.     Comment: Mirena IUD     Social Drivers of Health     Financial Resource Strain: Low Risk  (1/25/2024)    Overall Financial Resource Strain (CARDIA)     Difficulty of Paying Living Expenses: Not hard at all   Food Insecurity: No Food Insecurity (1/25/2024)    Hunger Vital Sign     Worried About Running Out of Food in the Last Year: Never true     Ran Out of Food in the Last Year: Never true   Transportation Needs: No  Transportation Needs (2024)    PRAPARE - Transportation     Lack of Transportation (Medical): No     Lack of Transportation (Non-Medical): No   Physical Activity: Inactive (2024)    Exercise Vital Sign     Days of Exercise per Week: 0 days     Minutes of Exercise per Session: 0 min   Stress: Stress Concern Present (2024)    [employer] [place] [employer] - Occupational Stress Questionnaire     Feeling of Stress : Rather much   Housing Stability: Low Risk  (2024)    Housing Stability Vital Sign     Unable to Pay for Housing in the Last Year: No     Number of Places Lived in the Last Year: 2     Unstable Housing in the Last Year: No       Current Medications[1]    Review of patient's allergies indicates:   Allergen Reactions    Azithromycin Rash    Codeine Hives    Minocycline Swelling     Other reaction(s): lips, eyes and hands swellig  Swelling of face and lips  Swelling of face and lips            OB History    Para Term  AB Living   0 0 0 0 0 0   SAB IAB Ectopic Multiple Live Births   0 0 0 0 0          Assessment/Plan:    Vaginal irritation  -     POCT Urinalysis(Instrument)    Acute vaginitis  -     C. trachomatis/N. gonorrhoeae by AMP DNA  -     Vaginosis Screen by DNA Probe      - Discussed deferring treatment until tests result d/t early pregnancy  - Bleeding/pain precautions given    Patient was counseled today on vaginitis prevention including :  a. avoiding feminine products such as deoderant soaps, body wash, bubble bath, douches, scented toilet paper, deoderant tampons or pads, feminine wipes, chronic pad use, etc.  b. avoiding other vulvovaginal irritants such as long hot baths, humidity, tight, synthetic clothing, chlorine and sitting around in wet bathing suits  c. wearing cotton underwear, avoiding thong underwear and no underwear to bed  d. taking showers instead of baths and use a hair dryer on cool setting afterwards to dry  e. wearing cotton to exercise  and shower immediately after exercise and change clothes  f. using polyurethane condoms without spermicide if sexually active and symptoms are triggered by intercourse     FOLLOW UP: PRN/lack of improvement.         [1]   Current Outpatient Medications   Medication Sig Dispense Refill    (Magic mouthwash) 1:1:1 diphenhydrAMINE(Benadryl) 12.5mg/5ml liq, aluminum & magnesium hydroxide-simethicone (Maalox), LIDOcaine viscous 2% Swish and spit 5 mLs every 2 (two) hours as needed (oral pain). for mouth sores 120 mL 2    albuterol (PROVENTIL/VENTOLIN HFA) 90 mcg/actuation inhaler Inhale 2 puffs into the lungs every 4 (four) hours as needed.      busPIRone (BUSPAR) 10 MG tablet Take 1 tablet (10 mg total) by mouth 3 (three) times daily as needed (anxiety). 90 tablet 1    ondansetron (ZOFRAN) 4 MG tablet Take 1 tablet (4 mg total) by mouth every 8 (eight) hours as needed for Nausea. 30 tablet 1    propranoloL (INDERAL) 10 MG tablet Take 1 tablet (10 mg total) by mouth daily as needed (anxiety). 30 tablet 1    dicyclomine (BENTYL) 10 MG capsule Take 1 capsule (10 mg total) by mouth 4 (four) times daily before meals and nightly. 120 capsule 11    omeprazole (PRILOSEC) 40 MG capsule Take 1 capsule (40 mg total) by mouth before breakfast. (Patient not taking: Reported on 3/19/2025) 90 capsule 3    tirzepatide, weight loss, (ZEPBOUND) 5 mg/0.5 mL PnIj Inject 5 mg into the skin every 7 days. (Patient not taking: Reported on 3/19/2025)      tiZANidine (ZANAFLEX) 2 MG tablet Take 2 tablets (4 mg total) by mouth nightly as needed (headache). (Patient not taking: Reported on 3/19/2025) 60 tablet 3     Current Facility-Administered Medications   Medication Dose Route Frequency Provider Last Rate Last Admin    measles, mumps and rubella vaccine 1,000-12,500 TCID50/0.5 mL injection 0.5 mL  0.5 mL Subcutaneous 1 time in Clinic/HOD Gloria Millan NP

## 2025-03-20 ENCOUNTER — CLINICAL SUPPORT (OUTPATIENT)
Dept: OBSTETRICS AND GYNECOLOGY | Facility: CLINIC | Age: 25
End: 2025-03-20
Payer: COMMERCIAL

## 2025-03-20 ENCOUNTER — PATIENT MESSAGE (OUTPATIENT)
Dept: OBSTETRICS AND GYNECOLOGY | Facility: CLINIC | Age: 25
End: 2025-03-20

## 2025-03-20 DIAGNOSIS — N91.2 ABSENT MENSES: Primary | ICD-10-CM

## 2025-03-20 LAB
BACTERIAL VAGINOSIS DNA: NOT DETECTED
CANDIDA GLABRATA/KRUSEI: NOT DETECTED
CANDIDA RRNA VAG QL PROBE: NOT DETECTED
TRICHOMONAS VAGINALIS: NOT DETECTED

## 2025-03-20 PROCEDURE — 99999 PR PBB SHADOW E&M-EST. PATIENT-LVL II: CPT | Mod: PBBFAC,,,

## 2025-03-20 NOTE — PROGRESS NOTES
Spoke with patient for a total of 24 minutes during virtual visit.  Updated chart to reflect up to date patient demographics.  Allergies, medications, pharmacy, medical/family history and OB history updated.  Patient was guided through expectations of care during pregnancy.  Pregnancy confirmation scheduled for 04/10/25 at 2:00pm with Dr. Vilchis.  Education provided & questions answered. Encouraged to send message or call office with any questions/concerns. Verbalized understanding.     Discussed with pt:    Lmp 02/10/25.  Pt is currently taking a PNV.   C/o nausea, no vomiting reported.  C/o cramping, spotting a few days ago.  Precautions discussed.  Referred to [email or web address] for Preg A to Z guide & class schedule.   Discussed benefits of breastfeeding - plans to breastfeed.   Discussed need for pediatrician. Mentioned [facility] and multiple convenient locations.

## 2025-03-21 LAB
BACTERIA UR CULT: NORMAL
BACTERIA UR CULT: NORMAL
C TRACH DNA SPEC QL NAA+PROBE: NOT DETECTED
N GONORRHOEA DNA SPEC QL NAA+PROBE: NOT DETECTED

## 2025-03-31 ENCOUNTER — PATIENT MESSAGE (OUTPATIENT)
Dept: OBSTETRICS AND GYNECOLOGY | Facility: CLINIC | Age: 25
End: 2025-03-31
Payer: COMMERCIAL

## 2025-04-03 DIAGNOSIS — O20.0 THREATENED ABORTION: Primary | ICD-10-CM

## 2025-04-03 RX ORDER — PROGESTERONE 100 MG/1
100 CAPSULE ORAL NIGHTLY
Qty: 30 CAPSULE | Refills: 3 | Status: SHIPPED | OUTPATIENT
Start: 2025-04-03 | End: 2026-04-03

## 2025-04-04 ENCOUNTER — LAB VISIT (OUTPATIENT)
Dept: LAB | Facility: HOSPITAL | Age: 25
End: 2025-04-04
Attending: OBSTETRICS & GYNECOLOGY
Payer: COMMERCIAL

## 2025-04-04 DIAGNOSIS — O20.0 THREATENED ABORTION: ICD-10-CM

## 2025-04-04 LAB — PROGEST SERPL-MCNC: 12.7 NG/ML

## 2025-04-04 PROCEDURE — 36415 COLL VENOUS BLD VENIPUNCTURE: CPT | Mod: PN

## 2025-04-04 PROCEDURE — 84144 ASSAY OF PROGESTERONE: CPT

## 2025-04-07 ENCOUNTER — PATIENT MESSAGE (OUTPATIENT)
Dept: OBSTETRICS AND GYNECOLOGY | Facility: CLINIC | Age: 25
End: 2025-04-07
Payer: COMMERCIAL

## 2025-04-14 ENCOUNTER — PATIENT MESSAGE (OUTPATIENT)
Dept: OBSTETRICS AND GYNECOLOGY | Facility: CLINIC | Age: 25
End: 2025-04-14

## 2025-04-14 ENCOUNTER — OFFICE VISIT (OUTPATIENT)
Dept: OBSTETRICS AND GYNECOLOGY | Facility: CLINIC | Age: 25
End: 2025-04-14
Payer: COMMERCIAL

## 2025-04-14 VITALS
WEIGHT: 192.25 LBS | SYSTOLIC BLOOD PRESSURE: 120 MMHG | BODY MASS INDEX: 37.74 KG/M2 | HEIGHT: 60 IN | DIASTOLIC BLOOD PRESSURE: 78 MMHG

## 2025-04-14 DIAGNOSIS — Z32.00 POSSIBLE PREGNANCY: Primary | ICD-10-CM

## 2025-04-14 LAB
B-HCG UR QL: POSITIVE
CTP QC/QA: YES

## 2025-04-14 PROCEDURE — 87086 URINE CULTURE/COLONY COUNT: CPT | Performed by: STUDENT IN AN ORGANIZED HEALTH CARE EDUCATION/TRAINING PROGRAM

## 2025-04-14 PROCEDURE — 87491 CHLMYD TRACH DNA AMP PROBE: CPT | Performed by: STUDENT IN AN ORGANIZED HEALTH CARE EDUCATION/TRAINING PROGRAM

## 2025-04-14 PROCEDURE — 99999 PR PBB SHADOW E&M-EST. PATIENT-LVL III: CPT | Mod: PBBFAC,,, | Performed by: STUDENT IN AN ORGANIZED HEALTH CARE EDUCATION/TRAINING PROGRAM

## 2025-04-14 RX ORDER — ASPIRIN 81 MG/1
81 TABLET ORAL DAILY
Qty: 150 TABLET | Refills: 2 | Status: SHIPPED | OUTPATIENT
Start: 2025-04-14 | End: 2026-04-14

## 2025-04-14 NOTE — PROGRESS NOTES
CC: Absence of menses    Ayaka Browne is a 24 y.o. female  presents with complaint of absence of menstruation.  She denies nausea/vomIting/abdominal pain/bleeding.  UPT is positive.     LMP 2/10/25 GA9w0d [id number]    Went to [facility] ED for spotting/cramping. Dr. Vilchis placed her on prometrium 100mg daily       OB hx:  G1    PMH: PCOS  PSH:  Social: L&D RN    Denies any family hx of genetic disorders, chromosomal abnormalities, Neural tube defects, or congenital heart defects.      Past Medical History:   Diagnosis Date    GERD (gastroesophageal reflux disease)      Past Surgical History:   Procedure Laterality Date    ANTERIOR CRUCIATE LIGAMENT REPAIR      COLONOSCOPY N/A 2024    Procedure: COLONOSCOPY;  Surgeon: Steve White Jr., MD;  Location: [facility];  Service: Endoscopy;  Laterality: N/A;    ESOPHAGOGASTRODUODENOSCOPY N/A 2024    Procedure: EGD (ESOPHAGOGASTRODUODENOSCOPY);  Surgeon: Steve White Jr., MD;  Location: [facility];  Service: Endoscopy;  Laterality: N/A;    TONSILLECTOMY  2006    TONSILLECTOMY AND ADENOIDECTOMY      UPPER GASTROINTESTINAL ENDOSCOPY      WISDOM TOOTH EXTRACTION      WRIST SURGERY       Social History     Socioeconomic History    Marital status:    Tobacco Use    Smoking status: Never    Smokeless tobacco: Never   Substance and Sexual Activity    Alcohol use: Not Currently     Alcohol/week: 1.0 standard drink of alcohol     Types: 1 Glasses of wine per week     Comment: Occ/socially    Drug use: Never    Sexual activity: Yes     Partners: Male     Birth control/protection: None     Social Drivers of Health     Financial Resource Strain: Low Risk  (2024)    Overall Financial Resource Strain (CARDIA)     Difficulty of Paying Living Expenses: Not hard at all   Food Insecurity: No Food Insecurity (2024)    Hunger Vital Sign     Worried About Running Out of Food in the Last Year: Never true     Ran Out of Food in the Last Year:  Never true   Transportation Needs: No Transportation Needs (2024)    PRAPARE - Transportation     Lack of Transportation (Medical): No     Lack of Transportation (Non-Medical): No   Physical Activity: Inactive (2024)    Exercise Vital Sign     Days of Exercise per Week: 0 days     Minutes of Exercise per Session: 0 min   Stress: Stress Concern Present (2024)    [employer] [place] [employer] - Occupational Stress Questionnaire     Feeling of Stress : Rather much   Housing Stability: Low Risk  (2024)    Housing Stability Vital Sign     Unable to Pay for Housing in the Last Year: No     Number of Places Lived in the Last Year: 2     Unstable Housing in the Last Year: No     Family History   Problem Relation Name Age of Onset    Ovarian cancer Paternal Grandmother julio     Cancer Paternal Grandmother julio     Colon cancer Maternal Grandmother      Early death Father yunier davis     Hyperlipidemia Mother bev davsi     Breast cancer Neg Hx      Crohn's disease Neg Hx      Ulcerative colitis Neg Hx      Stomach cancer Neg Hx      Esophageal cancer Neg Hx      Uterine cancer Neg Hx      Cervical cancer Neg Hx      Diabetes Neg Hx      Miscarriages / Stillbirths Neg Hx       OB History    Para Term  AB Living   1 0 0 0 0 0   SAB IAB Ectopic Multiple Live Births   0 0 0 0 0      # Outcome Date GA Lbr Gabo/2nd Weight Sex Type Anes PTL Lv   1 Current                /78 (BP Location: Left forearm, Patient Position: Sitting)   Ht 5' (1.524 m)   Wt 87.2 kg (192 lb 3.9 oz)   LMP 02/10/2025 (Exact Date)   BMI 37.54 kg/m²     ROS:  GENERAL: Denies weight gain or weight loss. Feeling well overall.   SKIN: Denies rash or lesions.   HEAD: Denies head injury or headache.   NODES: Denies enlarged lymph nodes.   CHEST: Denies chest pain or shortness of breath.   CARDIOVASCULAR: Denies palpitations or left sided chest pain.   ABDOMEN: No abdominal pain, constipation, diarrhea,  nausea, vomiting or rectal bleeding.   URINARY: No frequency, dysuria, hematuria, or burning on urination.  REPRODUCTIVE: See HPI.   HEMATOLOGIC: No easy bruisability or excessive bleeding.   MUSCULOSKELETAL: Denies joint pain or swelling.   NEUROLOGIC: Denies syncope or weakness.   PSYCHIATRIC: Denies depression, anxiety or mood swings.    PE:   APPEARANCE: Well nourished, well developed, in no acute distress.  AFFECT: WNL, alert and oriented x 3.  SKIN: No acne or hirsutism.  NECK: Neck symmetric without masses or thyromegaly.  NODES: No inguinal, cervical, axillary or femoral lymph node enlargement.  CHEST: Good respiratory effort.   ABDOMEN: Soft. No tenderness or masses. No hepatosplenomegaly. No hernias.      ULTRASOUND:   Ultrasound performed in the usual fashion, showing viable farris intrauterine pregnancy, crown-rump length =  13  mm with flicker,   consistent with     7w5d  No free fluid in cul-de-sac or adnexal pathology.    ASSESSMENT and PLAN:    ICD-10-CM ICD-9-CM    1. Possible pregnancy  Z32.00 V72.40 POCT Urine Pregnancy      Hepatitis C Antibody      HIV 1/2 Ag/Ab (4th Gen)      Treponema Pallidium Antibodies IgG, IgM      Hepatitis B surface antigen      Type & Screen      Rubella antibody, IgG      Urine culture      CBC auto differential      Basic metabolic panel      C. trachomatis/N. gonorrhoeae by AMP DNA [facility]; Urine      Varicella zoster antibody, IgG      RUBEOLA ANTIBODY IGG          Orders Placed This Encounter   Procedures    Urine culture           Send normal result to authorizing provider's In Basket if patient is active on MyChart::   Yes    Hepatitis C Antibody     Standing Status:   Future     Expected Date:   4/14/2025     Expiration Date:   7/13/2026     Release to patient:   Immediate     Send normal result to authorizing provider's In Basket if patient is active on MyChart::   Yes    HIV 1/2 Ag/Ab (4th Gen)     Standing Status:   Future     Expected Date:   4/14/2025      Expiration Date:   7/13/2026     Release to patient:   Immediate     Send normal result to authorizing provider's In Basket if patient is active on MyChart::   Yes    Treponema Pallidium Antibodies IgG, IgM     Standing Status:   Future     Expected Date:   4/14/2025     Expiration Date:   7/13/2026     Send normal result to authorizing provider's In Basket if patient is active on MyChart::   Yes    Hepatitis B surface antigen     Standing Status:   Future     Expected Date:   4/14/2025     Expiration Date:   7/13/2026     Send normal result to authorizing provider's In Basket if patient is active on MyChart::   Yes    Rubella antibody, IgG     Standing Status:   Future     Expected Date:   4/14/2025     Expiration Date:   7/13/2026     Send normal result to authorizing provider's In Basket if patient is active on MyChart::   Yes    CBC auto differential     Standing Status:   Future     Expected Date:   4/14/2025     Expiration Date:   7/13/2026     Send normal result to authorizing provider's In Basket if patient is active on MyChart::   Yes    Basic metabolic panel     Standing Status:   Future     Expected Date:   4/14/2025     Expiration Date:   7/13/2026     Send normal result to authorizing provider's In Basket if patient is active on MyChart::   Yes    C. trachomatis/N. gonorrhoeae by AMP DNA [facility]; Urine     Sources by Resulting Lab::   [facility]     Source::   Urine     Release to patient:   Immediate     Send normal result to authorizing provider's In Basket if patient is active on MyChart::   Yes    Varicella zoster antibody, IgG     Standing Status:   Future     Expected Date:   4/14/2025     Expiration Date:   7/13/2026     Send normal result to authorizing provider's In Basket if patient is active on MyChart::   Yes    RUBEOLA ANTIBODY IGG     Standing Status:   Future     Expected Date:   4/14/2025     Expiration Date:   7/13/2026     Send normal result to authorizing provider's In Basket if patient  is active on MyChart::   Yes    POCT Urine Pregnancy    Type & Screen     Standing Status:   Future     Expected Date:   4/14/2025     Expiration Date:   7/13/2026         - EMELY 11/26/25  - Patient was counseled today on proper weight gain based on the [place] [employer] [facility]'s recommendations based on her pre-pregnancy weight. Discussed foods to avoid in pregnancy (i.e. sushi, fish that are high in mercury, deli meat, and unpasteurized cheeses). Discussed prenatal vitamin options (i.e. stool softener, DHA). Discussed genetic screening, patient desires, discussed genetic carrier screening, patient desires   - 1T labs ordered  - pap due pp    - US performed today in clinic   - NIPT ordered  - ASA sent in        No follow-ups on file.

## 2025-04-16 LAB — BACTERIA UR CULT: ABNORMAL

## 2025-04-17 ENCOUNTER — RESULTS FOLLOW-UP (OUTPATIENT)
Dept: OBSTETRICS AND GYNECOLOGY | Facility: CLINIC | Age: 25
End: 2025-04-17

## 2025-04-18 LAB
C TRACH DNA SPEC QL NAA+PROBE: NOT DETECTED
CTGC SOURCE (OHS) ORD-325: NORMAL
N GONORRHOEA DNA UR QL NAA+PROBE: NOT DETECTED

## 2025-04-29 ENCOUNTER — RESULTS FOLLOW-UP (OUTPATIENT)
Dept: RHEUMATOLOGY | Facility: CLINIC | Age: 25
End: 2025-04-29

## 2025-05-14 ENCOUNTER — ROUTINE PRENATAL (OUTPATIENT)
Dept: OBSTETRICS AND GYNECOLOGY | Facility: CLINIC | Age: 25
End: 2025-05-14
Payer: COMMERCIAL

## 2025-05-14 ENCOUNTER — LAB VISIT (OUTPATIENT)
Dept: LAB | Facility: HOSPITAL | Age: 25
End: 2025-05-14
Attending: STUDENT IN AN ORGANIZED HEALTH CARE EDUCATION/TRAINING PROGRAM
Payer: COMMERCIAL

## 2025-05-14 VITALS
BODY MASS INDEX: 37.63 KG/M2 | DIASTOLIC BLOOD PRESSURE: 60 MMHG | WEIGHT: 192.69 LBS | SYSTOLIC BLOOD PRESSURE: 100 MMHG

## 2025-05-14 DIAGNOSIS — Z32.00 POSSIBLE PREGNANCY: ICD-10-CM

## 2025-05-14 DIAGNOSIS — Z3A.12 12 WEEKS GESTATION OF PREGNANCY: Primary | ICD-10-CM

## 2025-05-14 DIAGNOSIS — Z3A.12 12 WEEKS GESTATION OF PREGNANCY: ICD-10-CM

## 2025-05-14 LAB
ABSOLUTE EOSINOPHIL (OHS): 0.2 K/UL
ABSOLUTE MONOCYTE (OHS): 0.7 K/UL (ref 0.3–1)
ABSOLUTE NEUTROPHIL COUNT (OHS): 7.37 K/UL (ref 1.8–7.7)
ANION GAP (OHS): 9 MMOL/L (ref 8–16)
BASOPHILS # BLD AUTO: 0.04 K/UL
BASOPHILS NFR BLD AUTO: 0.4 %
BUN SERPL-MCNC: 5 MG/DL (ref 6–20)
CALCIUM SERPL-MCNC: 8.6 MG/DL (ref 8.7–10.5)
CHLORIDE SERPL-SCNC: 105 MMOL/L (ref 95–110)
CO2 SERPL-SCNC: 21 MMOL/L (ref 23–29)
CREAT SERPL-MCNC: 0.5 MG/DL (ref 0.5–1.4)
ERYTHROCYTE [DISTWIDTH] IN BLOOD BY AUTOMATED COUNT: 12.6 % (ref 11.5–14.5)
GFR SERPLBLD CREATININE-BSD FMLA CKD-EPI: >60 ML/MIN/1.73/M2
GLUCOSE SERPL-MCNC: 81 MG/DL (ref 70–110)
HBV SURFACE AG SERPL QL IA: NORMAL
HCT VFR BLD AUTO: 37.1 % (ref 37–48.5)
HCV AB SERPL QL IA: NORMAL
HGB BLD-MCNC: 12.3 GM/DL (ref 12–16)
HIV 1+2 AB+HIV1 P24 AG SERPL QL IA: NORMAL
IMM GRANULOCYTES # BLD AUTO: 0.06 K/UL (ref 0–0.04)
IMM GRANULOCYTES NFR BLD AUTO: 0.5 % (ref 0–0.5)
INDIRECT COOMBS: NORMAL
LYMPHOCYTES # BLD AUTO: 2.84 K/UL (ref 1–4.8)
MCH RBC QN AUTO: 28.7 PG (ref 27–31)
MCHC RBC AUTO-ENTMCNC: 33.2 G/DL (ref 32–36)
MCV RBC AUTO: 87 FL (ref 82–98)
NUCLEATED RBC (/100WBC) (OHS): 0 /100 WBC
PLATELET # BLD AUTO: 320 K/UL (ref 150–450)
PMV BLD AUTO: 10.1 FL (ref 9.2–12.9)
POTASSIUM SERPL-SCNC: 3.7 MMOL/L (ref 3.5–5.1)
RBC # BLD AUTO: 4.29 M/UL (ref 4–5.4)
RELATIVE EOSINOPHIL (OHS): 1.8 %
RELATIVE LYMPHOCYTE (OHS): 25.3 % (ref 18–48)
RELATIVE MONOCYTE (OHS): 6.2 % (ref 4–15)
RELATIVE NEUTROPHIL (OHS): 65.8 % (ref 38–73)
RH BLD: NORMAL
SODIUM SERPL-SCNC: 135 MMOL/L (ref 136–145)
SPECIMEN OUTDATE: NORMAL
T PALLIDUM IGG+IGM SER QL: NORMAL
WBC # BLD AUTO: 11.21 K/UL (ref 3.9–12.7)

## 2025-05-14 PROCEDURE — 87340 HEPATITIS B SURFACE AG IA: CPT

## 2025-05-14 PROCEDURE — 86901 BLOOD TYPING SEROLOGIC RH(D): CPT | Performed by: STUDENT IN AN ORGANIZED HEALTH CARE EDUCATION/TRAINING PROGRAM

## 2025-05-14 PROCEDURE — 86762 RUBELLA ANTIBODY: CPT

## 2025-05-14 PROCEDURE — 87389 HIV-1 AG W/HIV-1&-2 AB AG IA: CPT

## 2025-05-14 PROCEDURE — 99999 PR PBB SHADOW E&M-EST. PATIENT-LVL III: CPT | Mod: PBBFAC,,, | Performed by: STUDENT IN AN ORGANIZED HEALTH CARE EDUCATION/TRAINING PROGRAM

## 2025-05-14 PROCEDURE — 86593 SYPHILIS TEST NON-TREP QUANT: CPT

## 2025-05-14 PROCEDURE — 0500F INITIAL PRENATAL CARE VISIT: CPT | Mod: CPTII,S$GLB,, | Performed by: STUDENT IN AN ORGANIZED HEALTH CARE EDUCATION/TRAINING PROGRAM

## 2025-05-14 PROCEDURE — 80048 BASIC METABOLIC PNL TOTAL CA: CPT

## 2025-05-14 PROCEDURE — 85025 COMPLETE CBC W/AUTO DIFF WBC: CPT

## 2025-05-14 PROCEDURE — 86765 RUBEOLA ANTIBODY: CPT

## 2025-05-14 PROCEDURE — 36415 COLL VENOUS BLD VENIPUNCTURE: CPT | Mod: PN

## 2025-05-14 PROCEDURE — 86803 HEPATITIS C AB TEST: CPT

## 2025-05-14 PROCEDURE — 86787 VARICELLA-ZOSTER ANTIBODY: CPT

## 2025-05-14 NOTE — PROGRESS NOTES
Routine OB complaints today:None, No Bleeding or pains    /60   Wt 87.4 kg (192 lb 10.9 oz)   LMP 02/10/2025 (Exact Date)   BMI 37.63 kg/m²     24 y.o., at 12w0d by Estimated Date of Delivery: 25  Problem List[1]  OB History    Para Term  AB Living   1 0 0 0 0 0   SAB IAB Ectopic Multiple Live Births   0 0 0 0 0      # Outcome Date GA Lbr Gabo/2nd Weight Sex Type Anes PTL Lv   1 Current                Dating reviewed    Allergies and problem list reviewed and updated    Medical and surgical history reviewed    Prenatal labs reviewed and updated    Physical Exam:  ABD: soft, gravid, nontender,     Assessment:  Ayaka was seen today for routine prenatal visit.    Diagnoses and all orders for this visit:    12 weeks gestation of pregnancy  -     POCT Urinalysis(Instrument)  -     Cancel: Miscellaneous Test, Sendout [place]; Future  -     Cancel: Miscellaneous Test, Sendout [place]  -     Miscellaneous Test, Sendout [place]; Future  -     Connected MOM Enrollment  -     Assign Connected MOM Program Consent Questionnaire          Plan:   - connected MOM  - NIPT 1T labs  - start ASA   follow up 4Weeks, bleeding/pain precautions  kick counts, labor precautions given    Cait Almendarez M.D.  Obstetrics and Gynecology             [1]   Patient Active Problem List  Diagnosis    Lower abdominal pain    Belching    History of colitis

## 2025-05-15 LAB
RUBEOLA (MEASLES) IGG (OHS): 144 AU/ML
RUBEOLA IGG INTERP. (OHS): POSITIVE
RUBV IGG SER-ACNC: 59 IU/ML
RUBV IGG SER-IMP: REACTIVE
V.ZOSTER IGG INTERP (OHS): POSITIVE
VARICELLA ZOSTER IGG (OHS): 1.23 S/CO

## 2025-05-16 ENCOUNTER — PATIENT MESSAGE (OUTPATIENT)
Dept: OBSTETRICS AND GYNECOLOGY | Facility: CLINIC | Age: 25
End: 2025-05-16
Payer: COMMERCIAL

## 2025-05-20 ENCOUNTER — OFFICE VISIT (OUTPATIENT)
Dept: RHEUMATOLOGY | Facility: CLINIC | Age: 25
End: 2025-05-20
Payer: COMMERCIAL

## 2025-05-20 VITALS
WEIGHT: 193.56 LBS | SYSTOLIC BLOOD PRESSURE: 123 MMHG | HEIGHT: 60 IN | HEART RATE: 87 BPM | BODY MASS INDEX: 38 KG/M2 | DIASTOLIC BLOOD PRESSURE: 84 MMHG

## 2025-05-20 DIAGNOSIS — R19.7 DIARRHEA, UNSPECIFIED TYPE: ICD-10-CM

## 2025-05-20 DIAGNOSIS — N76.6 GENITAL ULCER, FEMALE: ICD-10-CM

## 2025-05-20 DIAGNOSIS — K13.79 MOUTH SORES: Primary | ICD-10-CM

## 2025-05-20 PROCEDURE — 99999 PR PBB SHADOW E&M-EST. PATIENT-LVL IV: CPT | Mod: PBBFAC,,, | Performed by: STUDENT IN AN ORGANIZED HEALTH CARE EDUCATION/TRAINING PROGRAM

## 2025-05-20 PROCEDURE — 3008F BODY MASS INDEX DOCD: CPT | Mod: CPTII,S$GLB,, | Performed by: STUDENT IN AN ORGANIZED HEALTH CARE EDUCATION/TRAINING PROGRAM

## 2025-05-20 PROCEDURE — 3074F SYST BP LT 130 MM HG: CPT | Mod: CPTII,S$GLB,, | Performed by: STUDENT IN AN ORGANIZED HEALTH CARE EDUCATION/TRAINING PROGRAM

## 2025-05-20 PROCEDURE — 1159F MED LIST DOCD IN RCRD: CPT | Mod: CPTII,S$GLB,, | Performed by: STUDENT IN AN ORGANIZED HEALTH CARE EDUCATION/TRAINING PROGRAM

## 2025-05-20 PROCEDURE — 1160F RVW MEDS BY RX/DR IN RCRD: CPT | Mod: CPTII,S$GLB,, | Performed by: STUDENT IN AN ORGANIZED HEALTH CARE EDUCATION/TRAINING PROGRAM

## 2025-05-20 PROCEDURE — 99214 OFFICE O/P EST MOD 30 MIN: CPT | Mod: S$GLB,,, | Performed by: STUDENT IN AN ORGANIZED HEALTH CARE EDUCATION/TRAINING PROGRAM

## 2025-05-20 PROCEDURE — 3079F DIAST BP 80-89 MM HG: CPT | Mod: CPTII,S$GLB,, | Performed by: STUDENT IN AN ORGANIZED HEALTH CARE EDUCATION/TRAINING PROGRAM

## 2025-05-20 ASSESSMENT — ROUTINE ASSESSMENT OF PATIENT INDEX DATA (RAPID3)
PAIN SCORE: 0
PATIENT GLOBAL ASSESSMENT SCORE: 0
MDHAQ FUNCTION SCORE: 0
PSYCHOLOGICAL DISTRESS SCORE: 1.1
TOTAL RAPID3 SCORE: 0
FATIGUE SCORE: 0

## 2025-05-20 NOTE — PROGRESS NOTES
Subjective:      Patient ID: Ayaka Browne is a 24 y.o. female.    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, but no uveitis, retinal vasculitis, characteristic skin lesions, pathergy  - Family History: Psoriasis: mother   - Social History: Never smoker, rare alcohol intake   - Gyn History: ; no VTE  - Occupation: Nurse L&D, starting [employer]  - Positive serologies: -   - Negative serologies: KEYONNA, RF, SSA, SSB, gliadin, TTG, RF, CCP, Adamaris-1, HLA B27, HLA B51  - Pathology:               - Oral cavity (ulcerated squamous mucosa with marked acute and chronic inflammation, HSV1 negative, negative for malignancy               - Terminal ileum, cecum, right colon, left colon, sigmoid rectum (11/15/2024) No enterocolitis identified  - Infectious screening labs:  - Imaging:              - CT abdomen (10/31/24) Fatty infiltration of the liver parenchyma without focal defect.   - Procedures   - Colonoscopy (2024) scattered mild inflammation in the rectum, in the recto-sigmoid colon and in the sigmoid colon secondary to proctosigmoid ulcerative colitis.  - EGD (24) normal oropharynx, larynx, cricopharyngeus, upper third and middle third of the esophagus, reflux esophagitis  - Previous Treatments:              - Magic mouthwash  - Current Treatments: -  - Family History: no upcoming plans for pregnancy, not currently on birth control      Interval History:   She is currently 13 weeks pregnant and reports frequent oral ulcers, and persistent diarrhea. This is the extent of her complaints at this time.     Objective:   /84 (BP Location: Right arm, Patient Position: Sitting)   Pulse 87   Ht 5' (1.524 m)   Wt 87.8 kg (193 lb 9 oz)   LMP 02/10/2025 (Exact Date)   BMI 37.80 kg/m²   Physical Exam   Constitutional: normal appearance.   HENT:   Head: Normocephalic and atraumatic.    Cardiovascular: Normal rate, regular rhythm and normal heart sounds.   Pulmonary/Chest: Effort normal and breath sounds normal.   Musculoskeletal:      Comments: No synovitis, dactylitis, enthesitis, effusions     Neurological: She is alert.   Skin: Skin is warm and dry. No rash noted.   No skin thickening, telangiectasias, calcinosis, psoriasiform lesions, lupoid lesions         No data to display     Labs (4/29/25)  CBC WBC, HGB, PLT WNL   CR .45, GFR WNL, AST, ALT WNL   ESR WNL  CRP 14.3   Assessment:     1. Mouth sores    2. Genital ulcer, female    3. Diarrhea, unspecified type      This is a 24 year old woman with past medical history of right ACL repair, esophageal candidiasis, GERD, left wrist surgery s/p fracture, fatty liver, and obesity on tirzepatide who was referred to rheumatology by ENT for recurrent oral ulcers and genital ulcers since middle school. No one else in the family has recurrent oral ulcers.  She has had multiple negative HSV 1/2. Celiac has been ruled out. Colonoscopy (11/14/2024) showed scattered mild inflammation in the rectum, in the recto-sigmoid colon and in the sigmoid colon secondary to proctosigmoid ulcerative colitis, but pathology showed no significant enterocolitis. She was given a prednisone mouthwash which has helped significantly, and now only gets 1-2 oral ulcers every 1-2 months, and genital ulcers even more rarely. She does occasionally get fevers (99.3), diarrhea (once a day), and abdominal cramping. She denies a history of characteristic skin lesions, pathergy, and uveitis.      Suspect probable Behcet's and discussed starting colchicine, but she is hesitant because she is pregnant. I gave her information to read on colchicine. She will need to follow up with GI gastroenterologist regarding her diarrhea and the scattered mild inflammation in the rectum, in the recto-sigmoid colon and in the sigmoid colon secondary to proctosigmoid ulcerative colitis (pathology was  negative).     Plan:     Problem List Items Addressed This Visit    None  Visit Diagnoses         Mouth sores    -  Primary    Relevant Orders    CBC Auto Differential    Sedimentation rate    C-Reactive Protein    Creatinine, Serum    AST (SGOT)    ALT (SGPT)      Genital ulcer, female        Relevant Orders    CBC Auto Differential    Sedimentation rate    C-Reactive Protein    Creatinine, Serum    AST (SGOT)    ALT (SGPT)      Diarrhea, unspecified type        Relevant Orders    CBC Auto Differential    Sedimentation rate    C-Reactive Protein    Creatinine, Serum    AST (SGOT)    ALT (SGPT)          - Prednisone mouthwash PRN for now  - Consider Colchicine 0.6mg BID in future, she prefers to use prednisone mouthwash alone for now    Follow up in 3 months    30 minutes of total time spent on the encounter, which includes face to face time and non-face to face time preparing to see the patient (eg, review of tests), Obtaining and/or reviewing separately obtained history, Documenting clinical information in the electronic or other health record, Independently interpreting results (not separately reported) and communicating results to the patient/family/caregiver, or Care coordination (not separately reported).     This note was prepared with [employer] Direct voice recognition transcription software. Garbled syntax, mangled pronouns, and other bizarre constructions may be attributed to that software system       Carin Brothers M.D.  Rheumatology Dept  [place], [place]

## 2025-06-11 ENCOUNTER — PATIENT MESSAGE (OUTPATIENT)
Dept: OTHER | Facility: OTHER | Age: 25
End: 2025-06-11
Payer: COMMERCIAL

## 2025-06-16 ENCOUNTER — PATIENT MESSAGE (OUTPATIENT)
Dept: OBSTETRICS AND GYNECOLOGY | Facility: CLINIC | Age: 25
End: 2025-06-16
Payer: COMMERCIAL

## 2025-06-18 ENCOUNTER — PATIENT MESSAGE (OUTPATIENT)
Dept: OTHER | Facility: OTHER | Age: 25
End: 2025-06-18
Payer: COMMERCIAL

## 2025-06-18 ENCOUNTER — ROUTINE PRENATAL (OUTPATIENT)
Dept: OBSTETRICS AND GYNECOLOGY | Facility: CLINIC | Age: 25
End: 2025-06-18
Payer: COMMERCIAL

## 2025-06-18 VITALS
WEIGHT: 195.75 LBS | SYSTOLIC BLOOD PRESSURE: 112 MMHG | DIASTOLIC BLOOD PRESSURE: 62 MMHG | BODY MASS INDEX: 38.23 KG/M2

## 2025-06-18 DIAGNOSIS — N89.8 VAGINAL ITCHING: ICD-10-CM

## 2025-06-18 DIAGNOSIS — Z3A.17 17 WEEKS GESTATION OF PREGNANCY: Primary | ICD-10-CM

## 2025-06-18 LAB
BILIRUBIN, UA POC OHS: NEGATIVE
BLOOD, UA POC OHS: NEGATIVE
CLARITY, UA POC OHS: ABNORMAL
COLOR, UA POC OHS: YELLOW
GLUCOSE, UA POC OHS: NEGATIVE
KETONES, UA POC OHS: NEGATIVE
LEUKOCYTES, UA POC OHS: ABNORMAL
NITRITE, UA POC OHS: NEGATIVE
PH, UA POC OHS: >=9
PROTEIN, UA POC OHS: NEGATIVE
SPECIFIC GRAVITY, UA POC OHS: 1.01
UROBILINOGEN, UA POC OHS: 0.2

## 2025-06-18 PROCEDURE — 81515 NFCT DS BV&VAGINITIS DNA ALG: CPT | Performed by: STUDENT IN AN ORGANIZED HEALTH CARE EDUCATION/TRAINING PROGRAM

## 2025-06-18 PROCEDURE — 99999 PR PBB SHADOW E&M-EST. PATIENT-LVL III: CPT | Mod: PBBFAC,,, | Performed by: STUDENT IN AN ORGANIZED HEALTH CARE EDUCATION/TRAINING PROGRAM

## 2025-06-18 RX ORDER — FLUCONAZOLE 150 MG/1
150 TABLET ORAL ONCE
Qty: 1 TABLET | Refills: 1 | Status: SHIPPED | OUTPATIENT
Start: 2025-06-18 | End: 2025-06-18

## 2025-06-18 NOTE — PROGRESS NOTES
"  Routine OB complaints today:vaginal itching, Good fetal movements reported, No Bleeding or pains    /62   Wt 88.8 kg (195 lb 12.3 oz)   LMP 02/10/2025 (Exact Date)   BMI 38.23 kg/m²     24 y.o., at 17w0d by Estimated Date of Delivery: 25  Problem List[1]  OB History    Para Term  AB Living   1 0 0 0 0 0   SAB IAB Ectopic Multiple Live Births   0 0 0 0 0      # Outcome Date GA Lbr Gabo/2nd Weight Sex Type Anes PTL Lv   1 Current                Dating reviewed    Allergies and problem list reviewed and updated    Medical and surgical history reviewed    Prenatal labs reviewed and updated    Physical Exam:  ABD: soft, gravid, nontender,   : erythematous vaginal tissue, ++ yeast    Assessment:  Ayaka Vanegas" was seen today for routine prenatal visit.    Diagnoses and all orders for this visit:    17 weeks gestation of pregnancy  -     POCT Urinalysis(Instrument)  -     US OB 14+ Wks, TransAbd, Single Gestation; Future    Vaginal itching  -     Vaginosis Screen by DNA Probe    Other orders  -     fluconazole (DIFLUCAN) 150 MG Tab; Take 1 tablet (150 mg total) by mouth once. REFILL AND RE-DOSE IF SYMPTOMS RECUR for 1 dose          Plan:   - anatomy ordered  - affirm/diflucan    follow up 4Weeks, bleeding/pain precautions  kick counts, labor precautions given    Cait Almendarez M.D.  Obstetrics and Gynecology             [1]   Patient Active Problem List  Diagnosis    Lower abdominal pain    Belching    History of colitis     "

## 2025-06-20 LAB
BACTERIAL VAGINOSIS DNA (OHS): NOT DETECTED
CANDIDA GLABRATA/KRUSEI DNA (OHS): NOT DETECTED
CANDIDA SPECIES DNA (OHS): NOT DETECTED
TRICHOMONAS VAGINALIS DNA (OHS): NOT DETECTED

## 2025-06-25 ENCOUNTER — PATIENT MESSAGE (OUTPATIENT)
Dept: RHEUMATOLOGY | Facility: CLINIC | Age: 25
End: 2025-06-25
Payer: COMMERCIAL

## 2025-07-09 ENCOUNTER — PATIENT MESSAGE (OUTPATIENT)
Dept: OTHER | Facility: OTHER | Age: 25
End: 2025-07-09
Payer: COMMERCIAL

## 2025-07-14 ENCOUNTER — ROUTINE PRENATAL (OUTPATIENT)
Dept: OBSTETRICS AND GYNECOLOGY | Facility: CLINIC | Age: 25
End: 2025-07-14
Payer: COMMERCIAL

## 2025-07-14 ENCOUNTER — HOSPITAL ENCOUNTER (OUTPATIENT)
Dept: RADIOLOGY | Facility: HOSPITAL | Age: 25
Discharge: HOME | End: 2025-07-14
Attending: STUDENT IN AN ORGANIZED HEALTH CARE EDUCATION/TRAINING PROGRAM
Payer: COMMERCIAL

## 2025-07-14 VITALS
BODY MASS INDEX: 38.23 KG/M2 | SYSTOLIC BLOOD PRESSURE: 104 MMHG | DIASTOLIC BLOOD PRESSURE: 84 MMHG | WEIGHT: 195.75 LBS

## 2025-07-14 DIAGNOSIS — Z3A.20 20 WEEKS GESTATION OF PREGNANCY: Primary | ICD-10-CM

## 2025-07-14 DIAGNOSIS — Z3A.17 17 WEEKS GESTATION OF PREGNANCY: ICD-10-CM

## 2025-07-14 LAB
BILIRUBIN, UA POC OHS: NEGATIVE
BLOOD, UA POC OHS: ABNORMAL
CLARITY, UA POC OHS: CLEAR
COLOR, UA POC OHS: YELLOW
GLUCOSE, UA POC OHS: NEGATIVE
KETONES, UA POC OHS: NEGATIVE
LEUKOCYTES, UA POC OHS: ABNORMAL
NITRITE, UA POC OHS: NEGATIVE
PH, UA POC OHS: 7
PROTEIN, UA POC OHS: NEGATIVE
SPECIFIC GRAVITY, UA POC OHS: 1.01
UROBILINOGEN, UA POC OHS: 0.2

## 2025-07-14 PROCEDURE — 76805 OB US >/= 14 WKS SNGL FETUS: CPT | Mod: TC,PN

## 2025-07-14 PROCEDURE — 0502F SUBSEQUENT PRENATAL CARE: CPT | Mod: CPTII,S$GLB,, | Performed by: STUDENT IN AN ORGANIZED HEALTH CARE EDUCATION/TRAINING PROGRAM

## 2025-07-14 PROCEDURE — 76805 OB US >/= 14 WKS SNGL FETUS: CPT | Mod: 26,,, | Performed by: RADIOLOGY

## 2025-07-14 PROCEDURE — 99999 PR PBB SHADOW E&M-EST. PATIENT-LVL III: CPT | Mod: PBBFAC,,, | Performed by: STUDENT IN AN ORGANIZED HEALTH CARE EDUCATION/TRAINING PROGRAM

## 2025-07-14 NOTE — PROGRESS NOTES
"Routine OB complaints today:None, Good fetal movements reported, No Bleeding or pains    /84   Wt 88.8 kg (195 lb 12.3 oz)   LMP 02/10/2025 (Exact Date)   BMI 38.23 kg/m²     24 y.o., at 20w5d by Estimated Date of Delivery: 25  Problem List[1]  OB History    Para Term  AB Living   1 0 0 0 0 0   SAB IAB Ectopic Multiple Live Births   0 0 0 0 0      # Outcome Date GA Lbr Gabo/2nd Weight Sex Type Anes PTL Lv   1 Current                Dating reviewed    Allergies and problem list reviewed and updated    Medical and surgical history reviewed    Prenatal labs reviewed and updated    Physical Exam:  ABD: soft, gravid, nontender,     Assessment:  Ayaka Vanegas" was seen today for routine prenatal visit.    Diagnoses and all orders for this visit:    20 weeks gestation of pregnancy  -     POCT Urinalysis(Instrument)  -     OB Glucose Screen; Future  -     CBC Auto Differential; Future          Plan:   - GDM ordered  - anatomy today, results not available yet    follow up 4Weeks, bleeding/pain precautions  kick counts, labor precautions given    Cait Almendarez M.D.  Obstetrics and Gynecology             [1]   Patient Active Problem List  Diagnosis    Lower abdominal pain    Belching    History of colitis     "

## 2025-07-16 DIAGNOSIS — Z3A.21 21 WEEKS GESTATION OF PREGNANCY: Primary | ICD-10-CM
